# Patient Record
Sex: FEMALE | Race: WHITE | URBAN - METROPOLITAN AREA
[De-identification: names, ages, dates, MRNs, and addresses within clinical notes are randomized per-mention and may not be internally consistent; named-entity substitution may affect disease eponyms.]

---

## 2017-10-23 ENCOUNTER — MEDICAL CORRESPONDENCE (OUTPATIENT)
Dept: HEALTH INFORMATION MANAGEMENT | Facility: CLINIC | Age: 27
End: 2017-10-23

## 2018-02-15 ENCOUNTER — TRANSFERRED RECORDS (OUTPATIENT)
Dept: HEALTH INFORMATION MANAGEMENT | Facility: CLINIC | Age: 28
End: 2018-02-15

## 2018-02-15 ENCOUNTER — MEDICAL CORRESPONDENCE (OUTPATIENT)
Dept: HEALTH INFORMATION MANAGEMENT | Facility: CLINIC | Age: 28
End: 2018-02-15

## 2018-02-16 ENCOUNTER — RADIANT APPOINTMENT (OUTPATIENT)
Dept: MRI IMAGING | Facility: CLINIC | Age: 28
End: 2018-02-16
Attending: FAMILY MEDICINE
Payer: COMMERCIAL

## 2018-02-16 DIAGNOSIS — R51.9 WORSENING HEADACHES: ICD-10-CM

## 2018-02-23 ASSESSMENT — ENCOUNTER SYMPTOMS
POLYDIPSIA: 0
HALLUCINATIONS: 0
WEAKNESS: 0
NECK MASS: 0
DIZZINESS: 0
NUMBNESS: 0
SEIZURES: 0
SPEECH CHANGE: 0
FEVER: 0
DECREASED APPETITE: 0
MEMORY LOSS: 0
TASTE DISTURBANCE: 0
TINGLING: 0
WEIGHT LOSS: 0
SINUS PAIN: 1
TREMORS: 0
SINUS CONGESTION: 0
CHILLS: 1
SORE THROAT: 0
PARALYSIS: 0
HOARSE VOICE: 0
WEIGHT GAIN: 0
TROUBLE SWALLOWING: 0
FATIGUE: 1
DISTURBANCES IN COORDINATION: 0
INCREASED ENERGY: 0
NIGHT SWEATS: 0
ALTERED TEMPERATURE REGULATION: 0
SMELL DISTURBANCE: 0
POLYPHAGIA: 0
HEADACHES: 1
LOSS OF CONSCIOUSNESS: 0

## 2018-02-25 ENCOUNTER — HEALTH MAINTENANCE LETTER (OUTPATIENT)
Age: 28
End: 2018-02-25

## 2018-03-05 ENCOUNTER — OFFICE VISIT (OUTPATIENT)
Dept: NEUROLOGY | Facility: CLINIC | Age: 28
End: 2018-03-05
Payer: COMMERCIAL

## 2018-03-05 VITALS
BODY MASS INDEX: 22.68 KG/M2 | HEART RATE: 78 BPM | HEIGHT: 70 IN | SYSTOLIC BLOOD PRESSURE: 114 MMHG | DIASTOLIC BLOOD PRESSURE: 67 MMHG | WEIGHT: 158.4 LBS

## 2018-03-05 DIAGNOSIS — R51.9 NONINTRACTABLE HEADACHE, UNSPECIFIED CHRONICITY PATTERN, UNSPECIFIED HEADACHE TYPE: ICD-10-CM

## 2018-03-05 DIAGNOSIS — R93.0 ABNORMAL MRI OF HEAD: ICD-10-CM

## 2018-03-05 DIAGNOSIS — I67.1 CEREBRAL ANEURYSM, NONRUPTURED: Primary | ICD-10-CM

## 2018-03-05 ASSESSMENT — PAIN SCALES - GENERAL: PAINLEVEL: NO PAIN (0)

## 2018-03-05 NOTE — PATIENT INSTRUCTIONS
Call if headaches are getting worse    Go to ER if headache is sudden and intractable (not going away with position changes)    Avoids aspirin products, NSAIDs

## 2018-03-05 NOTE — PROGRESS NOTES
"    [facility]    Ernestina Danielle MRN# [id number]   Age: 27 year old YOB: 1990     Requesting physician: Kristel Avilez  No primary care provider on file.     Chief Complaint:    Referred by Dr Avilez for new and different headaches and an abnormal MRI     History of Present Illness:    Ernestina is a 27-year-old right-handed woman who presents to discuss headaches. She has had new and different headaches for 4 months.  This pain occurs in certain positions such as laying on her stomach and pushing up while she is studying or bending over to tie her shoes. She describes a painful pressure sensation in the headache. She has a sharp pain over the right TMJ and then a pressure is felt in the back of the head. It is instant and is instantly relieved by changed position.  She has not been exercising recently to notice whether Valsalva or extreme exercise triggers the pain.  It is just a pain with no other associated symptoms.  Specifically she reports she has no tingling, weakness, numbness, speech, memory problems. Balance is ok.     In October she awoke with a series of headaches. She had bought a new pillow and when she switched those out it went away.  This new headaches started after that.    She has \"menstrual migraines\" for the past 2 years. She can have a side of the head. No nausea, light or sound sensitvity. She treats with Tylenol or Excedrin and it works.  Those headaches are much different than these new headaches.    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      Past Medical History:   Diagnosis Date     Allergic rhinitis      Asthma      Migraine        There is no problem list on file for this patient.      Past Surgical History:   Procedure Laterality Date     TONSILLECTOMY  1998       Social History     Social History     Marital status: Single     Spouse name: N/A     Number of children: N/A     Years of education: N/A " "    Occupational History     Not on file.     Social History Main Topics     Smoking status: Never Smoker     Smokeless tobacco: Never Used     Alcohol use No     Drug use: No     Sexual activity: No     Other Topics Concern     Not on file     Social History Narrative     No narrative on file     Getting PhD in epidemiology.   Caffeine 2 cups of coffee a day        Family History   Problem Relation Age of Onset     Breast Cancer Mother      Hypertension Mother      Hyperlipidemia Mother      Mother has migraine with aura.  No stroke risk, no family history of aneurysm    No current outpatient prescriptions on file.     No current facility-administered medications for this visit.        ROS: Please see HPI above  and pt quiestionaire below.  All other systems review and negative.    Physical Examination:  /67  Pulse 78  Ht 1.778 m (5' 10\")  Wt 71.8 kg (158 lb 6.4 oz)  BMI 22.73 kg/m2  General Appearance:  The patient is well-groomed and cooperative with examination.  Neurological Examination  Cognition: oriented x3, attention and recall intact. No aphasia or dysarthria  Cranial Nerves: 2-12 intact. Funduscopic examination is normal with sharp disc margins bilaterally.   General Motor Survey: Normal muscle bulk, tone and strength in all four ext. No tremor  Coordination: Finger to nose and heel knee shin normal bilaterally. Normal alternating movements.  Reflexes: Upper and lower extremity reflexes are within normal limits (2+) and bilaterally symmetric.   Sensory Examination:   Vibration: normal in all four ext     Gait: Normal gait which is stable on turns. Normal arm swing. Romberg negative.    Cardiovascular Examination:  Heart is regular in rate and rhythm to auscultation. No significant murmurs. No carotid bruits. No significant peripheral edema. Pedal pulses are palpable bilaterally.     Musculoskeletal Examination:  Neck is supple with full range of motion. No tenderness to " palpitations.      Investigations:  Images reviewed with patient during the appointment  Brain MRI without intravenous contrast  Head MRA without intravenous contrast     History:  ; Worsening headaches.  Comparison:  none  Technique:     BRAIN MRI: sagittal T1-weighted and axial diffusion, FLAIR,T2-weighted, and susceptibility images of the whole brain without intravenous contrast.      HEAD MR Angiogram: 3D time-of-flight MRA of the head was also obtained without intravenous contrast.     Findings:  Head MRI:      There is no mass effect or midline shift or intracranial hemorrhage.  Axial diffusion weighted images demonstrate no definite acute  infarction. Single nonspecific tiny focus of T2 hyperintensity located  in the left medial globus pallidus without any accompanying restricted  diffusion, suppression on FLAIR  or susceptibility artifact. The  ventricles do not appear enlarged out of proportion to the cerebral  sulci. The major intracranial vascular flow-voids do appear patent.  Clear paranasal sinuses and mastoid air cells. Orbital structures are  unremarkable. Visualized soft tissues are unremarkable.  Better visualized on sagittal T1-weighted sequences, there is a 3-4 mm  low positioning of the cerebellar tonsils consistent with borderline   cerebellar tonsillar ectopia. However, there is no mass effect on the  medulla or compression at the foramen magnum.     Head MRA: There is wide neck saccular outpouching arising from the  right posterior cerebral artery P1 segment, projecting posteriorly.  This is most consistent with a small saccular aneurysm with a neck  measurement of 2.4 Mm and dome to neck measured as 2 mm.  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 No associated aneurysm.  No stenosis is visualized. Dominant right vertebral  artery and  hypoplastic left vertebral artery. The anterior communicating artery  appears patent. Regarding the posterior communicating arteries, they  appear patent bilaterally. Bilateral irregularities of the cavernous  carotids without clear aneurysm present. There are likely normal  variant infundibula of the anterior choroidal arteries bilaterally  from the internal carotid arteries without clear aneurysmal  enlargement.         Impression:     1. 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 Recommend follow-up 3T head MRA in 6 months to confirm stability, which also could evaluate for stability of the suspected bilateral anterior choroidal-internal carotid artery infundibula. Also consider dedicated neuro interventional consult in the interim.  2. Solitary tiny focus of T2 hyperintensity in the right medial globus pallidus. Finding is nonspecific and most likely could represent sequela of nonspecific previous cerebral insult.  3. Borderline cerebellar tonsillar ectopia; this could also follow the current examination but is of doubtful clinical significance at this  time as there is no don herniation or compression of the brainstem.         I have personally reviewed the examination and initial interpretation and I agree with the findings.     RUPINDER SANTOS MD    Impression/Recommendations:    1.  Positional headaches  The patient is reporting that in a certain position with neck extension and also increased pressure in the head the patient gets pain right at the base of the skull.  I am concerned the cerebellar tonsillar ectopia is actually the cause of those headaches.  Further monitoring would be important with just avoidance of the triggers at this point in time.  Repeat MRI scan for 6 months time has been ordered.    2.  Nonruptured cerebral aneurysm  MRA angiography of the head reveals a small nonruptured intracerebral aneurysm off the posterior cerebral  artery.  Measurements of 2.4 mm x 2 mm.  At this point in time it is unclear whether this is symptomatic or not.  No hemorrhage was seen on the scan.  The pain the patient has been having is very short lasting.  I suspect it is an incidental finding but needs monitoring.  In 6 months time we will repeat MR angiography.  The patient was offered consultation with stroke neurology as well as cerebral angiography and she declines at this time.  We did review that she should avoid anticoagulation and antiplatelet therapy.  She will use Tylenol as needed for her pain symptoms.  She will contact me if the headaches getting worse and if she has a thunderclap headache that is unrelenting with position change she will go to the closest emergency department.      Answers for HPI/ROS submitted by the patient on 2/23/2018   General Symptoms: Yes  Skin Symptoms: No  HENT Symptoms: Yes  EYE SYMPTOMS: No  HEART SYMPTOMS: No  LUNG SYMPTOMS: No  INTESTINAL SYMPTOMS: No  URINARY SYMPTOMS: No  GYNECOLOGIC SYMPTOMS: No  BREAST SYMPTOMS: No  SKELETAL SYMPTOMS: No  BLOOD SYMPTOMS: No  NERVOUS SYSTEM SYMPTOMS: Yes  MENTAL HEALTH SYMPTOMS: No  Fever: No  Loss of appetite: No  Weight loss: No  Weight gain: No  Fatigue: Yes  Night sweats: No  Chills: Yes  Increased stress: Yes  Excessive hunger: No  Excessive thirst: No  Feeling hot or cold when others believe the temperature is normal: No  Loss of height: No  Post-operative complications: No  Surgical site pain: No  Hallucinations: No  Change in or Loss of Energy: No  Hyperactivity: No  Confusion: No  Ear pain: Yes  Ear discharge: No  Hearing loss: No  Tinnitus: Yes  Nosebleeds: No  Congestion: No  Sinus pain: Yes  Trouble swallowing: No   Voice hoarseness: No  Mouth sores: No  Sore throat: No  Tooth pain: No  Gum tenderness: No  Bleeding gums: No  Change in taste: No  Change in sense of smell: No  Dry mouth: No  Hearing aid used: No  Neck lump: No  Trouble with coordination: No  Dizziness  or trouble with balance: No  Fainting or black-out spells: No  Memory loss: No  Headache: Yes  Seizures: No  Speech problems: No  Tingling: No  Tremor: No  Weakness: No  Difficulty walking: No  Paralysis: No  Numbness: No

## 2018-03-05 NOTE — MR AVS SNAPSHOT
After Visit Summary   3/5/2018    Ernestina Danielle    MRN: [id number]           Patient Information     Date Of Birth          1990        Visit Information        Provider Department      3/5/2018 8:00 AM Trudy Rangel MD [facility] Neurology        Today's Diagnoses     Cerebral aneurysm, nonruptured    -  1    Abnormal MRI of head        Nonintractable headache, unspecified chronicity pattern, unspecified headache type          Care Instructions    Call if headaches are getting worse    Go to ER if headache is sudden and intractable (not going away with position changes)    Avoids aspirin products, NSAIDs          Follow-ups after your visit        Follow-up notes from your care team     Return in about 6 months (around 9/5/2018) for after MRI in 6 months.      Your next 10 appointments already scheduled     Sep 06, 2018  7:00 AM CDT   (Arrive by 6:45 AM)   MR BRAIN W/O CONTRAST with [address] MRI ([facility])    [address] [address] [phone]           Take your medicines as usual, unless your doctor tells you not to. Bring a list of your current medicines to your exam (including vitamins, minerals and over-the-counter drugs). Also bring the results of similar scans you may have had.  Please remove any body piercings and hair extensions before you arrive.  Follow your doctor s orders. If you do not, we may have to postpone your exam.  You may or may not receive IV contrast for this exam pending the discretion of the Radiologist.  You do not need to do anything special to prepare.  The MRI machine uses a strong magnet. Please wear clothes without metal (snaps, zippers). A sweatsuit works well, or we may give you a hospital gown.   **IMPORTANT** THE INSTRUCTIONS BELOW ARE ONLY FOR THOSE PATIENTS WHO HAVE BEEN PRESCRIBED SEDATION OR GENERAL ANESTHESIA DURING THEIR MRI PROCEDURE:  IF YOUR DOCTOR  PRESCRIBED ORAL SEDATION (take medicine to help you relax during your exam):   You must get the medicine from your doctor (oral medication) before you arrive. Bring the medicine to the exam. Do not take it at home. You ll be told when to take it upon arriving for your exam.   Arrive one hour early. Bring someone who can take you home after the test. Your medicine will make you sleepy. After the exam, you may not drive, take a bus or take a taxi by yourself.  IF YOUR DOCTOR PRESCRIBED IV SEDATION:   Arrive one hour early. Bring someone who can take you home after the test. Your medicine will make you sleepy. After the exam, you may not drive, take a bus or take a taxi by yourself.   No eating 6 hours before your exam. You may have clear liquids up until 4 hours before your exam. (Clear liquids include water, clear tea, black coffee and fruit juice without pulp.)  IF YOUR DOCTOR PRESCRIBED ANESTHESIA (be asleep for your exam):   Arrive 1 1/2 hours early. Bring someone who can take you home after the test. You may not drive, take a bus or take a taxi by yourself.   No eating 8 hours before your exam. You may have clear liquids up until 4 hours before your exam. (Clear liquids include water, clear tea, black coffee and fruit juice without pulp.)   You will spend four to five hours in the recovery room.  Please call the Imaging Department at your exam site with any questions.            Sep 06, 2018  7:45 AM CDT   (Arrive by 7:30 AM)   MR BRAIN & ANGIORAM with [id number]   [facility] ([facility])    [address] [address] [phone]           Take your medicines as usual, unless your doctor tells you not to. Bring a list of your current medicines to your exam (including vitamins, minerals and over-the-counter drugs). Also bring the results of similar scans you may have had.  Please remove any body piercings and hair extensions before you arrive.   Follow your doctor s orders. If you do not, we may have to postpone your exam.  You may or may not receive IV contrast for this exam pending the discretion of the Radiologist.  You do not need to do anything special to prepare.  The MRI machine uses a strong magnet. Please wear clothes without metal (snaps, zippers). A sweatsuit works well, or we may give you a hospital gown.   **IMPORTANT** THE INSTRUCTIONS BELOW ARE ONLY FOR THOSE PATIENTS WHO HAVE BEEN PRESCRIBED SEDATION OR GENERAL ANESTHESIA DURING THEIR MRI PROCEDURE:  IF YOUR DOCTOR PRESCRIBED ORAL SEDATION (take medicine to help you relax during your exam):   You must get the medicine from your doctor (oral medication) before you arrive. Bring the medicine to the exam. Do not take it at home. You ll be told when to take it upon arriving for your exam.   Arrive one hour early. Bring someone who can take you home after the test. Your medicine will make you sleepy. After the exam, you may not drive, take a bus or take a taxi by yourself.  IF YOUR DOCTOR PRESCRIBED IV SEDATION:   Arrive one hour early. Bring someone who can take you home after the test. Your medicine will make you sleepy. After the exam, you may not drive, take a bus or take a taxi by yourself.   No eating 6 hours before your exam. You may have clear liquids up until 4 hours before your exam. (Clear liquids include water, clear tea, black coffee and fruit juice without pulp.)  IF YOUR DOCTOR PRESCRIBED ANESTHESIA (be asleep for your exam):   Arrive 1 1/2 hours early. Bring someone who can take you home after the test. You may not drive, take a bus or take a taxi by yourself.   No eating 8 hours before your exam. You may have clear liquids up until 4 hours before your exam. (Clear liquids include water, clear tea, black coffee and fruit juice without pulp.)   You will spend four to five hours in the recovery room.  Please call the Imaging Department at your exam site with any questions.          "   Sep 10, 2018  8:00 AM CDT   (Arrive by 7:45 AM)   Return Visit with Trudy Rangel MD   [facility] Neurology ([facility])    [address] [address] [phone]              Future tests that were ordered for you today     Open Future Orders        Priority Expected Expires Ordered    MR Brain w/o Contrast Routine 9/5/2018 3/5/2019 3/5/2018    MRA Angiogram Brain Routine 9/5/2018 3/5/2019 3/5/2018            Who to contact     Please call your clinic at [phone] to:    Ask questions about your health    Make or cancel appointments    Discuss your medicines    Learn about your test results    Speak to your doctor            Additional Information About Your Visit        Med[employer] Information     [employer] gives you secure access to your electronic health record. If you see a primary care provider, you can also send messages to your care team and make appointments. If you have questions, please call your primary care clinic.  If you do not have a primary care provider, please call [phone] and they will assist you.      [employer] is an electronic gateway that provides easy, online access to your medical records. With [employer], you can request a clinic appointment, read your test results, renew a prescription or communicate with your care team.     To access your existing account, please contact your [facility] or call [phone] for assistance.        Care EveryWhere ID     This is your Care EveryWhere ID. This could be used by other organizations to access your [place] medical records  [id number]        Your Vitals Were     Pulse Height BMI (Body Mass Index)             78 1.778 m (5' 10\") 22.73 kg/m2          Blood Pressure from Last 3 Encounters:   03/05/18 114/67    Weight from Last 3 Encounters:   03/05/18 71.8 kg (158 lb 6.4 oz)               Primary Care Provider    None Specified       No primary " provider on file.        Equal Access to Services     LISA GRAY : Hadii guillaume Espinosa, laney cadena, shanika millan. So [facility] [phone].    ATENCIÓN: Si habla español, tiene a medrano disposición servicios gratuitos de asistencia lingüística. Llame al [phone].    We comply with applicable federal civil rights laws and [place] laws. We do not discriminate on the basis of race, color, national origin, age, disability, sex, sexual orientation, or gender identity.            Thank you!     Thank you for choosing [facility]  for your care. Our goal is always to provide you with excellent care. Hearing back from our patients is one way we can continue to improve our services. Please take a few minutes to complete the written survey that you may receive in the mail after your visit with us. Thank you!             Your Updated Medication List - Protect others around you: Learn how to safely use, store and throw away your medicines at www.disposemymeds.org.      Notice  As of 3/5/2018  8:50 AM    You have not been prescribed any medications.

## 2018-03-05 NOTE — LETTER
"3/5/2018       RE: Ernestina Danielle  [address]   [facility] [address]     Dear Colleague,    Thank you for referring your patient, Ernestina Danielle, to the [facility] NEUROLOGY at [facility]. Please see a copy of my visit note below.        [facility]    Ernestina Danielle MRN# [id number]   Age: 27 year old YOB: 1990     Requesting physician: Kristel Avilez  No primary care provider on file.     Chief Complaint:    Referred by Dr Avilez for new and different headaches and an abnormal MRI     History of Present Illness:    Ernestina is a 27-year-old right-handed woman who presents to discuss headaches. She has had new and different headaches for 4 months.  This pain occurs in certain positions such as laying on her stomach and pushing up while she is studying or bending over to tie her shoes. She describes a painful pressure sensation in the headache. She has a sharp pain over the right TMJ and then a pressure is felt in the back of the head. It is instant and is instantly relieved by changed position.  She has not been exercising recently to notice whether Valsalva or extreme exercise triggers the pain.  It is just a pain with no other associated symptoms.  Specifically she reports she has no tingling, weakness, numbness, speech, memory problems. Balance is ok.     In October she awoke with a series of headaches. She had bought a new pillow and when she switched those out it went away.  This new headaches started after that.    She has \"menstrual migraines\" for the past 2 years. She can have a side of the head. No nausea, light or sound sensitvity. She treats with Tylenol or Excedrin and it works.  Those headaches are much different than these new headaches.    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      Past Medical History:   Diagnosis Date     Allergic rhinitis      Asthma      Migraine        There " "is no problem list on file for this patient.      Past Surgical History:   Procedure Laterality Date     TONSILLECTOMY  1998       Social History     Social History     Marital status: Single     Spouse name: N/A     Number of children: N/A     Years of education: N/A     Occupational History     Not on file.     Social History Main Topics     Smoking status: Never Smoker     Smokeless tobacco: Never Used     Alcohol use No     Drug use: No     Sexual activity: No     Other Topics Concern     Not on file     Social History Narrative     No narrative on file     Getting PhD in epidemiology.   Caffeine 2 cups of coffee a day        Family History   Problem Relation Age of Onset     Breast Cancer Mother      Hypertension Mother      Hyperlipidemia Mother      Mother has migraine with aura.  No stroke risk, no family history of aneurysm    No current outpatient prescriptions on file.     No current facility-administered medications for this visit.        ROS: Please see HPI above  and pt quiestionaire below.  All other systems review and negative.    Physical Examination:  /67  Pulse 78  Ht 1.778 m (5' 10\")  Wt 71.8 kg (158 lb 6.4 oz)  BMI 22.73 kg/m2  General Appearance:  The patient is well-groomed and cooperative with examination.  Neurological Examination  Cognition: oriented x3, attention and recall intact. No aphasia or dysarthria  Cranial Nerves: 2-12 intact. Funduscopic examination is normal with sharp disc margins bilaterally.   General Motor Survey: Normal muscle bulk, tone and strength in all four ext. No tremor  Coordination: Finger to nose and heel knee shin normal bilaterally. Normal alternating movements.  Reflexes: Upper and lower extremity reflexes are within normal limits (2+) and bilaterally symmetric.   Sensory Examination:   Vibration: normal in all four ext     Gait: Normal gait which is stable on turns. Normal arm swing. Romberg negative.    Cardiovascular Examination:  Heart is regular " in rate and rhythm to auscultation. No significant murmurs. No carotid bruits. No significant peripheral edema. Pedal pulses are palpable bilaterally.     Musculoskeletal Examination:  Neck is supple with full range of motion. No tenderness to palpitations.      Investigations:  Images reviewed with patient during the appointment  Brain MRI without intravenous contrast  Head MRA without intravenous contrast     History:  ; Worsening headaches.  Comparison:  none  Technique:     BRAIN MRI: sagittal T1-weighted and axial diffusion, FLAIR,T2-weighted, and susceptibility images of the whole brain without intravenous contrast.      HEAD MR Angiogram: 3D time-of-flight MRA of the head was also obtained without intravenous contrast.     Findings:  Head MRI:      There is no mass effect or midline shift or intracranial hemorrhage.  Axial diffusion weighted images demonstrate no definite acute  infarction. Single nonspecific tiny focus of T2 hyperintensity located  in the left medial globus pallidus without any accompanying restricted  diffusion, suppression on FLAIR  or susceptibility artifact. The  ventricles do not appear enlarged out of proportion to the cerebral  sulci. The major intracranial vascular flow-voids do appear patent.  Clear paranasal sinuses and mastoid air cells. Orbital structures are  unremarkable. Visualized soft tissues are unremarkable.  Better visualized on sagittal T1-weighted sequences, there is a 3-4 mm  low positioning of the cerebellar tonsils consistent with borderline   cerebellar tonsillar ectopia. However, there is no mass effect on the  medulla or compression at the foramen magnum.     Head MRA: There is wide neck saccular outpouching arising from the  right posterior cerebral artery P1 segment, projecting posteriorly.  This is most consistent with a small saccular aneurysm with a neck  measurement of 2.4 Mm and dome to neck measured as 2 mm.  Fenestration-duplication present at the  bifurcation of the internal  carotid artery into the right anterior cerebral artery A1 segment and  proximal right middle cerebral artery, considered as a normal  variation. No associated aneurysm.  No stenosis is visualized. Dominant right vertebral artery and  hypoplastic left vertebral artery. The anterior communicating artery  appears patent. Regarding the posterior communicating arteries, they  appear patent bilaterally. Bilateral irregularities of the cavernous  carotids without clear aneurysm present. There are likely normal  variant infundibula of the anterior choroidal arteries bilaterally  from the internal carotid arteries without clear aneurysmal  enlargement.         Impression:     1. 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 Recommend follow-up 3T head MRA in 6 months to confirm stability, which also could evaluate for stability of the suspected bilateral anterior choroidal-internal carotid artery infundibula. Also consider dedicated neuro interventional consult in the interim.  2. Solitary tiny focus of T2 hyperintensity in the right medial globus pallidus. Finding is nonspecific and most likely could represent sequela of nonspecific previous cerebral insult.  3. Borderline cerebellar tonsillar ectopia; this could also follow the current examination but is of doubtful clinical significance at this  time as there is no don herniation or compression of the brainstem.         I have personally reviewed the examination and initial interpretation and I agree with the findings.     RUPINDER SANTOS MD    Impression/Recommendations:    1.  Positional headaches  The patient is reporting that in a certain position with neck extension and also increased pressure in the head the patient gets pain right at the base of the skull.  I am concerned the cerebellar tonsillar ectopia is actually the cause of those headaches.  Further monitoring would be important with  just avoidance of the triggers at this point in time.  Repeat MRI scan for 6 months time has been ordered.    2.  Nonruptured cerebral aneurysm  MRA angiography of the head reveals a small nonruptured intracerebral aneurysm off the posterior cerebral artery.  Measurements of 2.4 mm x 2 mm.  At this point in time it is unclear whether this is symptomatic or not.  No hemorrhage was seen on the scan.  The pain the patient has been having is very short lasting.  I suspect it is an incidental finding but needs monitoring.  In 6 months time we will repeat MR angiography.  The patient was offered consultation with stroke neurology as well as cerebral angiography and she declines at this time.  We did review that she should avoid anticoagulation and antiplatelet therapy.  She will use Tylenol as needed for her pain symptoms.  She will contact me if the headaches getting worse and if she has a thunderclap headache that is unrelenting with position change she will go to the closest emergency department.    Again, thank you for allowing me to participate in the care of your patient.      Sincerely,    Trudy Rangel MD

## 2018-03-06 ENCOUNTER — NURSE TRIAGE (OUTPATIENT)
Dept: NURSING | Facility: CLINIC | Age: 28
End: 2018-03-06

## 2018-03-06 ENCOUNTER — HOSPITAL ENCOUNTER (EMERGENCY)
Facility: CLINIC | Age: 28
Discharge: HOME | End: 2018-03-06
Attending: EMERGENCY MEDICINE | Admitting: EMERGENCY MEDICINE
Payer: COMMERCIAL

## 2018-03-06 ENCOUNTER — APPOINTMENT (OUTPATIENT)
Dept: CT IMAGING | Facility: CLINIC | Age: 28
End: 2018-03-06
Attending: EMERGENCY MEDICINE
Payer: COMMERCIAL

## 2018-03-06 VITALS
TEMPERATURE: 98.2 F | SYSTOLIC BLOOD PRESSURE: 112 MMHG | HEART RATE: 88 BPM | OXYGEN SATURATION: 98 % | WEIGHT: 159 LBS | BODY MASS INDEX: 22.76 KG/M2 | RESPIRATION RATE: 16 BRPM | DIASTOLIC BLOOD PRESSURE: 67 MMHG | HEIGHT: 70 IN

## 2018-03-06 DIAGNOSIS — R20.2 FACIAL PARESTHESIA: ICD-10-CM

## 2018-03-06 LAB
ANION GAP SERPL CALCULATED.3IONS-SCNC: 7 MMOL/L (ref 3–14)
APTT PPP: 33 SEC (ref 22–37)
BASOPHILS # BLD AUTO: 0 10E9/L (ref 0–0.2)
BASOPHILS NFR BLD AUTO: 0.4 %
BUN SERPL-MCNC: 14 MG/DL (ref 7–30)
CALCIUM SERPL-MCNC: 9.4 MG/DL (ref 8.5–10.1)
CHLORIDE SERPL-SCNC: 106 MMOL/L (ref 94–109)
CO2 SERPL-SCNC: 28 MMOL/L (ref 20–32)
CREAT BLD-MCNC: 0.7 MG/DL (ref 0.52–1.04)
CREAT SERPL-MCNC: 0.69 MG/DL (ref 0.52–1.04)
DIFFERENTIAL METHOD BLD: NORMAL
EOSINOPHIL # BLD AUTO: 0 10E9/L (ref 0–0.7)
EOSINOPHIL NFR BLD AUTO: 0.5 %
ERYTHROCYTE [DISTWIDTH] IN BLOOD BY AUTOMATED COUNT: 12.4 % (ref 10–15)
GFR SERPL CREATININE-BSD FRML MDRD: >90 ML/MIN/1.7M2
GFR SERPL CREATININE-BSD FRML MDRD: >90 ML/MIN/1.7M2
GLUCOSE SERPL-MCNC: 98 MG/DL (ref 70–99)
HCG UR QL: NEGATIVE
HCT VFR BLD AUTO: 40.5 % (ref 35–47)
HGB BLD-MCNC: 13.5 G/DL (ref 11.7–15.7)
IMM GRANULOCYTES # BLD: 0 10E9/L (ref 0–0.4)
IMM GRANULOCYTES NFR BLD: 0.1 %
INR BLD: 1.2 (ref 0.86–1.14)
INTERNAL QC OK POCT: YES
LYMPHOCYTES # BLD AUTO: 2.3 10E9/L (ref 0.8–5.3)
LYMPHOCYTES NFR BLD AUTO: 30.4 %
MCH RBC QN AUTO: 29.3 PG (ref 26.5–33)
MCHC RBC AUTO-ENTMCNC: 33.3 G/DL (ref 31.5–36.5)
MCV RBC AUTO: 88 FL (ref 78–100)
MONOCYTES # BLD AUTO: 0.4 10E9/L (ref 0–1.3)
MONOCYTES NFR BLD AUTO: 4.7 %
NEUTROPHILS # BLD AUTO: 4.9 10E9/L (ref 1.6–8.3)
NEUTROPHILS NFR BLD AUTO: 63.9 %
NRBC # BLD AUTO: 0 10*3/UL
NRBC BLD AUTO-RTO: 0 /100
PLATELET # BLD AUTO: 211 10E9/L (ref 150–450)
POTASSIUM SERPL-SCNC: 3.8 MMOL/L (ref 3.4–5.3)
RBC # BLD AUTO: 4.6 10E12/L (ref 3.8–5.2)
SODIUM SERPL-SCNC: 140 MMOL/L (ref 133–144)
TROPONIN I BLD-MCNC: 0 UG/L (ref 0–0.1)
WBC # BLD AUTO: 7.6 10E9/L (ref 4–11)

## 2018-03-06 PROCEDURE — 85025 COMPLETE CBC W/AUTO DIFF WBC: CPT | Performed by: EMERGENCY MEDICINE

## 2018-03-06 PROCEDURE — 25000128 H RX IP 250 OP 636: Performed by: STUDENT IN AN ORGANIZED HEALTH CARE EDUCATION/TRAINING PROGRAM

## 2018-03-06 PROCEDURE — 85610 PROTHROMBIN TIME: CPT | Mod: QW

## 2018-03-06 PROCEDURE — 85730 THROMBOPLASTIN TIME PARTIAL: CPT | Performed by: EMERGENCY MEDICINE

## 2018-03-06 PROCEDURE — 93010 ELECTROCARDIOGRAM REPORT: CPT | Mod: Z6 | Performed by: EMERGENCY MEDICINE

## 2018-03-06 PROCEDURE — 81025 URINE PREGNANCY TEST: CPT | Performed by: EMERGENCY MEDICINE

## 2018-03-06 PROCEDURE — 80048 BASIC METABOLIC PNL TOTAL CA: CPT | Performed by: EMERGENCY MEDICINE

## 2018-03-06 PROCEDURE — 99285 EMERGENCY DEPT VISIT HI MDM: CPT | Mod: 25 | Performed by: EMERGENCY MEDICINE

## 2018-03-06 PROCEDURE — 93005 ELECTROCARDIOGRAM TRACING: CPT | Performed by: EMERGENCY MEDICINE

## 2018-03-06 PROCEDURE — 82565 ASSAY OF CREATININE: CPT

## 2018-03-06 PROCEDURE — 70498 CT ANGIOGRAPHY NECK: CPT

## 2018-03-06 PROCEDURE — 84484 ASSAY OF TROPONIN QUANT: CPT

## 2018-03-06 RX ORDER — IOPAMIDOL 755 MG/ML
75 INJECTION, SOLUTION INTRAVASCULAR ONCE
Status: COMPLETED | OUTPATIENT
Start: 2018-03-06 | End: 2018-03-06

## 2018-03-06 RX ADMIN — IOPAMIDOL 75 ML: 755 INJECTION, SOLUTION INTRAVENOUS at 19:03

## 2018-03-06 ASSESSMENT — ENCOUNTER SYMPTOMS
UNEXPECTED WEIGHT CHANGE: 0
EYE REDNESS: 0
FEVER: 0
NUMBNESS: 0
FACIAL ASYMMETRY: 0
ARTHRALGIAS: 0
APPETITE CHANGE: 0
COLOR CHANGE: 0
SPEECH DIFFICULTY: 0
CHILLS: 0
VOMITING: 0
DIFFICULTY URINATING: 0
ABDOMINAL PAIN: 0
HEADACHES: 0
SHORTNESS OF BREATH: 0
CONFUSION: 0
WEAKNESS: 0
NECK STIFFNESS: 0
NAUSEA: 0

## 2018-03-06 ASSESSMENT — VISUAL ACUITY
OU: NORMAL ACUITY

## 2018-03-06 NOTE — ED AVS SNAPSHOT
[facility], [place], Emergency Department    [address] [place] [id number]    Phone:  [phone]                                       Ernestina Danielle   MRN: [id number]    Department:  [facility], Emergency Department   Date of Visit:  3/6/2018           After Visit Summary Signature Page     I have received my discharge instructions, and my questions have been answered. I have discussed any challenges I see with this plan with the nurse or doctor.    ..........................................................................................................................................  Patient/Patient Representative Signature      ..........................................................................................................................................  Patient Representative Print Name and Relationship to Patient    ..................................................               ................................................  Date                                            Time    ..........................................................................................................................................  Reviewed by Signature/Title    ...................................................              ..............................................  Date                                                            Time

## 2018-03-06 NOTE — TELEPHONE ENCOUNTER
Patient was seen at clinic yesterday for ongoing headaches. Today she does not have a headache, but has tingling on the left side of her face, in her cheek. Has been diagnosed with a small brain aneurysm, she reported. Has been able to go about her normal daily activities today. Said both sides of her face move when she changes her facial expression.     Protocol and care advice reviewed.   Caller states understanding of the recommended disposition.  Advised to call back if further questions or concerns.     Lilliam Abraham RN/FNA    Reason for Disposition    [1] Tingling (e.g., pins and needles) of the face, arm / hand, or leg / foot on one side of the body AND [2] present now    Additional Information    Negative: [1] SEVERE weakness (i.e., unable to walk or barely able to walk, requires support) AND [2] new onset or worsening    Negative: [1] Weakness (i.e., paralysis, loss of muscle strength) of the face, arm / hand, or leg / foot on one side of the body AND [2] sudden onset AND [3] present now    Negative: [1] Numbness (i.e., loss of sensation) of the face, arm / hand, or leg / foot on one side of the body AND [2] sudden onset AND [3] present now    Negative: [1] Loss of speech or garbled speech AND [2] sudden onset AND [3] present now    Negative: Difficult to awaken or acting confused  (e.g., disoriented, slurred speech)    Negative: Sounds like a life-threatening emergency to the triager    Negative: Confusion, disorientation, or hallucinations is main symptom    Negative: Neck pain is main symptom (and having weakness, numbness, or tingling in arm / hand because of neck pain)    Negative: Back pain is main symptom (and having weakness, numbness, or tingling in leg because of back pain)    Negative: Hand pain is main symptom (and having mild weakness, numbness, or tingling in hand related to hand pain)    Negative: Dizziness is main symptom    Negative: Vision loss or change is main symptom    Negative:  Followed a head injury within last 3 days    Negative: Followed a neck injury within last 3 days    Negative: [1] Tingling in both hands and/or feet AND [2] breathing faster than normal AND [3] feels similar to prior panic attack or hyperventilation episode    Negative: Weakness in both sides of the body or weakness all over    Negative: Headache  (and neurologic deficit)    Negative: [1] Back pain AND [2] numbness (loss of sensation) in groin or rectal area    Negative: [1] Unable to urinate (or only a few drops) > 4 hours AND [2] bladder feels very full (e.g., palpable bladder or strong urge to urinate)    Negative: [1] Loss of control of bowel or bladder (i.e., incontinence) AND [2] new onset    Negative: [1] Weakness (i.e., paralysis, loss of muscle strength) of the face, arm / hand, or leg / foot on one side of the body AND [2] sudden onset AND [3] brief (now gone)    Negative: [1] Numbness (i.e., loss of sensation) of the face, arm / hand, or leg / foot on one side of the body AND [2] sudden onset AND [3] brief (now gone)    Negative: [1] Loss of speech or garbled speech AND [2] sudden onset AND [3] brief (now gone)    Negative: Bell's palsy suspected (i.e., weakness on only one side of the face, developing over hours to days, no other symptoms)    Negative: Patient sounds very sick or weak to the triager    Negative: Neck pain  (and neurologic deficit)    Negative: Back pain  (and neurologic deficit)    Negative: [1] Weakness of the face, arm / hand, or leg / foot on one side of the body AND [2] gradual onset (e.g., days to weeks) AND [3] present now    Negative: [1] Numbness (i.e., loss of sensation) of the face, arm / hand, or leg / foot on one side of the body AND [2] gradual onset (e.g., days to weeks) AND [3] present now    Negative: [1] Loss of speech or garbled speech AND [2] gradual onset (e.g., days to weeks) AND [3] present now    Protocols used: NEUROLOGIC DEFICIT-ADULT-AH

## 2018-03-06 NOTE — ED PROVIDER NOTES
"  History     Chief Complaint   Patient presents with     Tingling     left side face     HPI  Ernestina Danielle is a 27 year old female with history of posterior cerebral artery aneurysm c/b positional headaches and borderline cerebellar tonsillar ectopia who presents to the ED for the evaluation of left-sided facial paresthesias. The patient reports the acute onset of the paresthesias to be at around 13:30, about 4 hours prior to arrival today. The patient states that they have been constant and located over her left zygoma since, sometimes radiating superiorly around her orbital and sometimes down into her left neck, and she describes them as feeling like when \"you can't move your face after being outside in the cold for a while, except I wasn't outside.\" She denies any explicit numbness or weakness in the area or otherwise, stating instead that her sensation in the area is just altered. The patient does experience positional headaches thought to be secondary to her aneurysm, but she denies any change to the nature of those, and she does not have a persistent headache. No trouble speaking or finding words, and her ambulation is unaffected. No vision changes or associated trauma. No birth control use or hx of stroke. No recent fevers, chills, nausea, abdominal pain, or vomiting. No change to appetite, and the patient has been eating and drinking well. No chance of pregnancy. The patient offers no other concerns or complaints at this time.            PAST MEDICAL HISTORY:   Past Medical History:   Diagnosis Date     Allergic rhinitis      Asthma      Migraine        PAST SURGICAL HISTORY:   Past Surgical History:   Procedure Laterality Date     TONSILLECTOMY  1998       FAMILY HISTORY:   Family History   Problem Relation Age of Onset     Breast Cancer Mother      Hypertension Mother      Hyperlipidemia Mother        SOCIAL HISTORY:   Social History   Substance Use Topics     Smoking status: Never Smoker     Smokeless " "tobacco: Never Used     Alcohol use No       There are no discharge medications for this patient.         Allergies   Allergen Reactions     Seasonal Allergies Itching     Running nose, itchy eyes               I have reviewed the Medications, Allergies, Past Medical and Surgical History, and Social History in the Epic system.    Review of Systems   Constitutional: Negative for appetite change, chills, fever and unexpected weight change.   HENT: Negative for congestion.    Eyes: Negative for redness.   Respiratory: Negative for shortness of breath.    Cardiovascular: Negative for chest pain.   Gastrointestinal: Negative for abdominal pain, nausea and vomiting.   Genitourinary: Negative for difficulty urinating.   Musculoskeletal: Negative for arthralgias and neck stiffness.   Skin: Negative for color change.   Neurological: Negative for facial asymmetry, speech difficulty, weakness, numbness and headaches.        Paresthesias on left face   Psychiatric/Behavioral: Negative for confusion.       Physical Exam   BP: 124/66  Pulse: 88  Heart Rate: 74  Temp: 98.2  F (36.8  C)  Resp: 16  Height: 177.8 cm (5' 10\")  Weight: 72.1 kg (159 lb)  SpO2: 97 %      Physical Exam  General: awake, alert, NAD  Head: normal cephalic  HEENT: pupils equal, conjugate gaze in tact  Neck: Supple  CV: regular rate and rhythm without murmur  Lungs: clear to ascultation  Abd: soft, non-tender, no guarding, no peritoneal signs  EXT: lower extremities without swelling or edema  Neuro: awake, answers questions appropriately. No focal deficits noted.  Cranial nerves II through XII intact with the exception of subjective difference in touch on the face.  5 out of 5 strength in bilateral upper and lower extremities.  Normal finger to nose testing.  Normal tandem gait.  Negative Romberg.  ED Course     ED Course     Procedures             EKG Interpretation:      Interpreted by George Payne  Time reviewed: 1807  Symptoms at time of EKG: facial " numbness  Rhythm: normal sinus   Rate: normal  Axis: normal  Ectopy: none  Conduction: normal  ST Segments/ T Waves: No ST-T wave changes  Q Waves: none  Comparison to prior: No old EKG available    Clinical Impression: normal EKG                 Labs Ordered and Resulted from Time of ED Arrival Up to the Time of Departure from the ED   INR POINT OF CARE - Abnormal; Notable for the following:        Result Value    INR Point of Care 1.2 (*)     All other components within normal limits   HCG QUAL URINE POCT - Normal   CBC WITH PLATELETS DIFFERENTIAL   BASIC METABOLIC PANEL   PARTIAL THROMBOPLASTIN TIME   CREATININE POCT   NOTIFY   ISTAT INR NURSING POCT   ISTAT TROPONIN NURSING POCT   ISTAT CREATININE NURSING POCT   TROPONIN POCT            Assessments & Plan (with Medical Decision Making)   Georgia is a 27-year-old female who presents with approximately 6 hours of unilateral of facial tingling.  No other neuro deficits noted on exam.  She has an NIH score of 0.  I did consult neurology fellow regarding whether to make this a stroke code, he did feel that a stroke code was indicated.  I obtained a stat head CT and stat CTA and stat neurological consult.  This workup is currently pending.    Patient got a CT head and CTA which did not show any acute findings.  Neurology came and consulted.  They felt the deficit did not correspond with a vascular territory and thought likelihood of stroke was quite low.  No bleeding appreciated on head CT.  No other acute findings that could explain patient's symptoms.  They were concerned that potentially this could represent MS, recommended outpatient MRI in 3 months to follow-up from lesion noted on CT scan today.    Remainder of her lab evaluation was unremarkable including negative pregnancy test, negative trop, normal CBC, normal BMP.     At this point neurology recommended outpatient follow-up with her neurologist and repeat MRI in 3 months.  Patient stated her symptoms have  improved while being in the ER though has not completely resolved.  She did not develop any new neurologic symptoms while she was in the emergency department and her neuro exam remained unchanged on repeat.    I discussed with the patient if she were to develop any new neurologic deficits, if she developed a headache, or new symptoms she should return to the ER for repeat evaluation otherwise follow-up with her neurologist as outlined above.  She understands and agrees to this follow-up plan.    I have reviewed the nursing notes.    I have reviewed the findings, diagnosis, plan and need for follow up with the patient.    There are no discharge medications for this patient.      Final diagnoses:   Facial paresthesia   IMayur, am serving as a trained medical scribe to document services personally performed by George Payne MD, based on the provider's statements to me.      George RIGGINS MD, was physically present and have reviewed and verified the accuracy of this note documented by Mayur Zambrano.       3/6/2018   [facility], [place], EMERGENCY DEPARTMENT     George Payne MD  03/07/18 0024

## 2018-03-06 NOTE — TELEPHONE ENCOUNTER
"Reason for call: Ernestina calling FNA back to request to speak to a Neurologist about her symptoms. She had previously spoken to another FNA nurse today who recommended Ernestina be seen within 4 hours (UC/ER) due to her reported symptom of facial tingling. She is questioning if urgent care or ER care is appropriate as she was \"just seen yesterday\" in the [facility] Neurology clinic. Ernestina reports her symptoms \"haven't changed\" since previous triage call and she denies any new symptoms.   Symptoms: left sided facial tingling- cheek area, from eye to lips. \"I can feel the tingling in my fingers and cheek when I touch my face\".   Symptoms started today at 3 pm.    Denies pain, no headache. Denies vision changes. Denies left sided weakness, numbness, or tingling on left side of body (or right side). Denies swelling or redness of left side of face. Denies left ear symptoms.  Home cares tried: called for triage.  Educated to symptoms of stroke. Discussed care plan from [facility] Neurology clinic notes (EPIC encounter dated 3/5/18). Emergent symptoms reviewed.   Care advice given per triage guideline; advised caller to be seen within 4 hours in ER (or she could try UC first, may send her to ER). Caller verbalized understanding of care advice given and plans to speak to on call neurologist for 2nd level triage now. This nurse paged the Neurology Resident on call for the [facility] Neurology clinic at 5:03pm via [phone] () to call the patient back directly at [phone]. Caller advised to call FNA back in 30 minutes if no call from provider. Advised to seek immediate medical care or call 911 if symptoms worsen or new symptoms develop while waiting for provider call. Advised Ernestina to have another adult drive for safety. Reports she is currently home alone.     Katerin Brownlee RN  [facility]    Reason for Disposition    [1] Tingling (e.g., pins and needles) of the face, arm / hand, or leg / foot on one side of " the body AND [2] present now     Left sided facial tingling- cheek area    Started at 3 pm today    Additional Information    Negative: [1] SEVERE weakness (i.e., unable to walk or barely able to walk, requires support) AND [2] new onset or worsening    Negative: [1] Weakness (i.e., paralysis, loss of muscle strength) of the face, arm / hand, or leg / foot on one side of the body AND [2] sudden onset AND [3] present now    Negative: [1] Numbness (i.e., loss of sensation) of the face, arm / hand, or leg / foot on one side of the body AND [2] sudden onset AND [3] present now    Negative: [1] Loss of speech or garbled speech AND [2] sudden onset AND [3] present now    Negative: Difficult to awaken or acting confused  (e.g., disoriented, slurred speech)    Negative: Sounds like a life-threatening emergency to the triager    Negative: Confusion, disorientation, or hallucinations is main symptom    Negative: Neck pain is main symptom (and having weakness, numbness, or tingling in arm / hand because of neck pain)    Negative: Back pain is main symptom (and having weakness, numbness, or tingling in leg because of back pain)    Negative: Hand pain is main symptom (and having mild weakness, numbness, or tingling in hand related to hand pain)    Negative: Dizziness is main symptom    Negative: Vision loss or change is main symptom    Negative: Followed a head injury within last 3 days    Negative: Followed a neck injury within last 3 days    Negative: [1] Tingling in both hands and/or feet AND [2] breathing faster than normal AND [3] feels similar to prior panic attack or hyperventilation episode    Negative: Weakness in both sides of the body or weakness all over    Negative: Headache  (and neurologic deficit)    Negative: [1] Back pain AND [2] numbness (loss of sensation) in groin or rectal area    Negative: [1] Unable to urinate (or only a few drops) > 4 hours AND [2] bladder feels very full (e.g., palpable bladder or  "strong urge to urinate)    Negative: [1] Loss of control of bowel or bladder (i.e., incontinence) AND [2] new onset    Negative: [1] Weakness (i.e., paralysis, loss of muscle strength) of the face, arm / hand, or leg / foot on one side of the body AND [2] sudden onset AND [3] brief (now gone)    Negative: [1] Numbness (i.e., loss of sensation) of the face, arm / hand, or leg / foot on one side of the body AND [2] sudden onset AND [3] brief (now gone)    Negative: [1] Loss of speech or garbled speech AND [2] sudden onset AND [3] brief (now gone)    Negative: Bell's palsy suspected (i.e., weakness on only one side of the face, developing over hours to days, no other symptoms)    Negative: Patient sounds very sick or weak to the triager    Negative: Neck pain  (and neurologic deficit)    Negative: Back pain  (and neurologic deficit)    Negative: [1] Weakness of the face, arm / hand, or leg / foot on one side of the body AND [2] gradual onset (e.g., days to weeks) AND [3] present now    Negative: [1] Numbness (i.e., loss of sensation) of the face, arm / hand, or leg / foot on one side of the body AND [2] gradual onset (e.g., days to weeks) AND [3] present now    Negative: [1] Loss of speech or garbled speech AND [2] gradual onset (e.g., days to weeks) AND [3] present now    Answer Assessment - Initial Assessment Questions  1. SYMPTOM: \"What is the main symptom you are concerned about?\" (e.g., weakness, numbness)      Left sided facial tingling, cheek  2. ONSET: \"When did this start?\" (minutes, hours, days; while sleeping)      2 hours ago  3. LAST NORMAL: \"When was the last time you were normal (no symptoms)?\"      Before 3 pm today  4. PATTERN \"Does this come and go, or has it been constant since it started?\"  \"Is it present now?\"      denies  5. CARDIAC SYMPTOMS: \"Have you had any of the following symptoms: chest pain, difficulty breathing, palpitations?\"      Didn't report  6. NEUROLOGIC SYMPTOMS: \"Have you had any " "of the following symptoms: headache, dizziness, vision loss, double vision, changes in speech, unsteady on your feet?\"      denies  7. OTHER SYMPTOMS: \"Do you have any other symptoms?\"      denies  8. PREGNANCY: \"Is there any chance you are pregnant?\" \"When was your last menstrual period?\"      Didn't report    Protocols used: NEUROLOGIC DEFICIT-ADULT-AH    "

## 2018-03-06 NOTE — ED NOTES
Pt presents with c/o left facial tingling that started a couple hours ago. No other symptoms to report. Hx of aneurysm and tonsillar ectopia.

## 2018-03-06 NOTE — ED AVS SNAPSHOT
[facility], Emergency Department    [address] [address]    Phone:  [phone]                                       Ernestina Danielle   MRN: [id number]    Department:  [facility], Emergency Department   Date of Visit:  3/6/2018           Patient Information     Date Of Birth          1990        Your diagnoses for this visit were:     Facial paresthesia        You were seen by George Payne MD.        Discharge Instructions       Return to the emergency department with any new or worsening symptoms specifically any unilateral weakness, headache, or other neurologic changes.    Please follow-up with neurology.  Would recommend sooner than her scheduled six-month appointment.  Neurology also recommended MRI with contrast in 3 months. Please schedule this through either your PCP or neurologist.     Future Appointments        Provider Department Dept Phone Center    9/6/2018 7:00 AM [facility] MRI ROOM [address] [place] [address] [phone] [facility]    9/6/2018 7:45 AM [facility] MRI ROOM [address] [place] [address] [phone] [facility]    9/10/2018 8:00 AM Trudy Rangel MD [facility] Neurology [phone] [facility]      24 Hour Appointment Hotline       To make an appointment at any [facility], call [phone] ([phone]). If you don't have a family doctor or clinic, we will help you find one. [facility] clinics are conveniently located to serve the needs of you and your family.             Review of your medicines      Notice     You have not been prescribed any medications.            Procedures and tests performed during your visit     Basic metabolic panel    CBC with platelets differential    CT Head Neck Angio w/o & w Contrast    Creatinine POCT    EKG 12-lead, tracing only    INR point of care    ISTAT INR nursing POCT    ISTAT creatinine nursing POCT    ISTAT troponin nursing POCT    Notify CT that Stroke patient is in ED     Partial thromboplastin time    Troponin POCT    hCG qual urine POCT      Orders Needing Specimen Collection     None      Pending Results     Date and Time Order Name Status Description    3/6/2018 1806 EKG 12-lead, tracing only Preliminary             Pending Culture Results     No orders found from 3/4/2018 to 3/7/2018.            Pending Results Instructions     If you had any lab results that were not finalized at the time of your Discharge, you can call the ED Lab Result RN at [phone]. You will be contacted by this team for any positive Lab results or changes in treatment. The nurses are available 7 days a week from 10A to 6:30P.  You can leave a message 24 hours per day and they will return your call.        Thank you for choosing [facility]       Thank you for choosing [facility] for your care. Our goal is always to provide you with excellent care. Hearing back from our patients is one way we can continue to improve our services. Please take a few minutes to complete the written survey that you may receive in the mail after you visit with us. Thank you!        [facility] Information     [employer] gives you secure access to your electronic health record. If you see a primary care provider, you can also send messages to your care team and make appointments. If you have questions, please call your primary care clinic.  If you do not have a primary care provider, please call [phone] and they will assist you.        Care EveryWhere ID     This is your Care EveryWhere ID. This could be used by other organizations to access your [facility] medical records  [id number]        Equal Access to Services     LISA GRAY : Hadii guillaume Espinosa, waaxda luqadaha, qaybta kaalmada adejericayada, waxay idiin hayaan adeeg kharash la'aan . So [facility] [phone].    ATENCIÓN: Si habla español, tiene a medrano disposición servicios gratuitos de asistencia lingüística. Llame al [phone].    We comply with applicable federal  civil rights laws and [place] laws. We do not discriminate on the basis of race, color, national origin, age, disability, sex, sexual orientation, or gender identity.            After Visit Summary       This is your record. Keep this with you and show to your community pharmacist(s) and doctor(s) at your next visit.

## 2018-03-07 ENCOUNTER — CARE COORDINATION (OUTPATIENT)
Dept: NEUROLOGY | Facility: CLINIC | Age: 28
End: 2018-03-07

## 2018-03-07 ENCOUNTER — TELEPHONE (OUTPATIENT)
Dept: NEUROLOGY | Facility: CLINIC | Age: 28
End: 2018-03-07

## 2018-03-07 LAB — INTERPRETATION ECG - MUSE: NORMAL

## 2018-03-07 NOTE — TELEPHONE ENCOUNTER
I spoke with patient. She felt she was doing ok with anxiety but now questions this. Over all feels ok but not back to normal today. She describes facial numbness and a feeling of tightness. Told her to call back if symptoms get worse. I will add her on to my schedule for 11 am on Monday to check in with her before she leaves for spring break.     If she is feeling better she can cancel the appt.    Trudy Rangel MD [facility]  Department of Neurology    ----- Message from Eunice Solitario RN sent at 3/7/2018 10:41 AM CST -----  Patient was in the ED yesterday.  She is requesting that you look at her chart and her CT and if you are concerned with the findings she will come in to see you.  She is asking that you call her back to go over the findings.  She can be reached at [phone].    Thank you,  Eunice

## 2018-03-07 NOTE — CONSULTS
[facility], [place]      Neurology Stroke Consult    Patient Name: Ernestina Danielle  : 1990 MRN#: [id number]    STROKE DATA    Stroke Code:  Stroke code not activated.  Time patient seen:  2018 1830  Last known normal (pt's baseline):  2018 1330    TPA treatment:  Not given due to outside the time window, minor / isolated / quickly resolving stroke symptoms.     National Institutes of Health Stroke Scale (at presentation)  NIHSS done at:  time patient seen      Score    Level of consciousness:  (0)   Alert, keenly responsive     LOC questions:  (0)   Answers both questions correctly    LOC commands:  (0)   Performs both tasks correctly    Best gaze:  (0)   Normal    Visual:  (0)   No visual loss    Facial palsy:  (0)   Normal symmetrical movements    Motor arm (left):  (0)   No drift    Motor arm (right):  (0)   No drift    Motor leg (left):  (0)   No drift    Motor leg (right):  (0)   No drift    Limb ataxia:  (0)   Absent    Sensory:  (0)   Normal- no sensory loss    Best language:  (0)   Normal- no aphasia    Dysarthria:  (0)   Normal    Extinction and inattention:  (0)   No abnormality        NIHSS Total Score:  0        Dysphagia Screen  Time of screening: Deferred as symptoms not likely due to stroke  Screening results: N/A     ASSESSMENT & RECOMMENDATIONS     The patient was seen for left facial tingling. This started at 1330 and has been persistent. It is located over the zygomatic arch. Unlikely to be due to stroke or any vascular abnormality. Given her history of R PCOMM aneurysm CT/CTA were performed and found to be negative.  Differential would also include abnormal headache, anxiety, and MS. Her neuro exam is reassuring and she was seen by her neurologist yesterday. On her MRI the patient does have a T2 hyperintensity that we feel should be followed up with an MRI with contrast in about 3-4 months with general neurology consult.     Recommendations:  -MRI  with contrast in 3 months  -Follow up in general neurology clinic after MRI     The patient will be managed by the ED team and  we will sign off at this time.  Thank you for the consult.  Contact the stroke team if you have any further questions.    PEPE Danielle is a 27 year old female with PMH significant for R PCA aneurysm and headaches who presents to the ED for left facial tingling that developed around 1330 this afternoon. She states that she bent over to plug in her laptop and when she got back up she felt tingling on her left face over her zygomatic arch. Sometimes the tingling radiates up to around her orbit. She denies other neurologic deficits including diplopia, numbness/weakness, tinnitus, aphasia.     Of note, patient has a history of headache and has seen a neurologist for this issue. Her OP neurologist states that this is likely positional headache as it often occurs when changing positions. Workup included MRI that revealed the aforementioned aneurysm, as well as a nonspecific T2 hyperintensity in the right cerebral hemisphere and cerebellar ectopia that is of questionable significance. The patient denies a history of headache today.     Pertinent Past Medical/Surgical History  Past Medical History:   Diagnosis Date     Allergic rhinitis      Asthma      Migraine        Past Surgical History:   Procedure Laterality Date     TONSILLECTOMY  1998       Medications: I have reviewed this patient's current medications.    Allergies:   Allergies   Allergen Reactions     Seasonal Allergies Itching     Running nose, itchy eyes   .    Family History:   Family History   Problem Relation Age of Onset     Breast Cancer Mother      Hypertension Mother      Hyperlipidemia Mother    .    Social History:   Social History   Substance Use Topics     Smoking status: Never Smoker     Smokeless tobacco: Never Used     Alcohol use No   .    Tobacco use: Never    ROS:  The 10 point Review of Systems is negative other  than noted in the HPI or here.     PHYSICAL EXAMINATION  Vital Signs:  B/P: 124/66,  T: 98.2,  P: 88,  R: 16    General:  patient lying in bed without any acute distress    HEENT:  normocephalic/atraumatic  Cardio:  Regular rate  Pulmonary:  no respiratory distress  Abdomen:  soft, non-tender  Extremities:  no edema  Skin:  intact, warm/dry     Neurologic  Mental Status:  fully alert, attentive and oriented, follows commands, speech clear and fluent  Cranial Nerves:  visual fields intact, PERRL, EOMI with normal smooth pursuit, facial movements symmetric, hearing not formally tested but intact to conversation, palate elevation symmetric and uvula midline, no dysarthria, shoulder shrug strong bilaterally, tongue protrusion midline, facial sensation intact b/l  Motor:  no abnormal movements, normal tone throughout, normal muscle bulk, no pronator drift, able to move all limbs spontaneously, strength 5/5 throughout upper and lower extremities  Reflexes:  2+ and symmetric throughout, no clonus  Sensory:  Intact to light touch and temperature sensation throughout  Coordination:  FNF and HS intact without dysmetria  Station/Gait:  deferred    Labs  Labs and Imaging reviewed and used in developing the plan; pertinent results included.     No results found for: GLC    The patient was discussed with the Fellow, Dr. Campos.  The staff is Dr. Winchester.    Chung Benito MD

## 2018-03-07 NOTE — PROGRESS NOTES
Catalina Gomez [facility]-Neurosci--Adult-[facility]        Phone Number: [phone]                     Pt calling was seen in ED on 3/6/18 for facial tingling and a CT was done. Per pt the ed asked her to follow up with her neurologist. Per pt the ED determined the facial tingling is most likely stress related. Pt is asking for Dr. Rangel to look at her chart and CT and if she is concerned with the findings she will come in to see her. Pt is asking for a call back to go over findings.   Pt can be reached at [phone]     Thank You,   Catalina      3/7/18:  Message sent to Dr. Rangel asking her to review her chart and call Ernestina with her findings per the patient's request.

## 2018-03-12 ENCOUNTER — OFFICE VISIT (OUTPATIENT)
Dept: NEUROLOGY | Facility: CLINIC | Age: 28
End: 2018-03-12
Payer: COMMERCIAL

## 2018-03-12 VITALS — HEART RATE: 67 BPM | HEIGHT: 70 IN | DIASTOLIC BLOOD PRESSURE: 69 MMHG | SYSTOLIC BLOOD PRESSURE: 108 MMHG

## 2018-03-12 DIAGNOSIS — I67.1 CEREBRAL ANEURYSM, NONRUPTURED: Primary | ICD-10-CM

## 2018-03-12 DIAGNOSIS — R20.9 DISTURBANCE OF SKIN SENSATION: ICD-10-CM

## 2018-03-12 ASSESSMENT — PAIN SCALES - GENERAL: PAINLEVEL: NO PAIN (0)

## 2018-03-12 NOTE — MR AVS SNAPSHOT
After Visit Summary   3/12/2018    Ernestina Danielle    MRN: [id number]           Patient Information     Date Of Birth          1990        Visit Information        Provider Department      3/12/2018 11:00 AM Trudy Rangel MD [facility] Neurology        Today's Diagnoses     Cerebral aneurysm, nonruptured    -  1    Disturbance of skin sensation           Follow-ups after your visit        Follow-up notes from your care team     Return if symptoms worsen or fail to improve.      Your next 10 appointments already scheduled     Sep 06, 2018  7:00 AM CDT   (Arrive by 6:45 AM)   MR BRAIN W/O CONTRAST with [address] [facility] [place] [facility] ([facility] [place])    [address]  [facility] [address] [phone]           Take your medicines as usual, unless your doctor tells you not to. Bring a list of your current medicines to your exam (including vitamins, minerals and over-the-counter drugs). Also bring the results of similar scans you may have had.  Please remove any body piercings and hair extensions before you arrive.  Follow your doctor s orders. If you do not, we may have to postpone your exam.  You may or may not receive IV contrast for this exam pending the discretion of the Radiologist.  You do not need to do anything special to prepare.  The MRI machine uses a strong magnet. Please wear clothes without metal (snaps, zippers). A sweatsuit works well, or we may give you a hospital gown.   **IMPORTANT** THE INSTRUCTIONS BELOW ARE ONLY FOR THOSE PATIENTS WHO HAVE BEEN PRESCRIBED SEDATION OR GENERAL ANESTHESIA DURING THEIR MRI PROCEDURE:  IF YOUR DOCTOR PRESCRIBED ORAL SEDATION (take medicine to help you relax during your exam):   You must get the medicine from your doctor (oral medication) before you arrive. Bring the medicine to the exam. Do not take it at home. You ll be told when to take it upon arriving for your exam.   Arrive one hour  early. Bring someone who can take you home after the test. Your medicine will make you sleepy. After the exam, you may not drive, take a bus or take a taxi by yourself.  IF YOUR DOCTOR PRESCRIBED IV SEDATION:   Arrive one hour early. Bring someone who can take you home after the test. Your medicine will make you sleepy. After the exam, you may not drive, take a bus or take a taxi by yourself.   No eating 6 hours before your exam. You may have clear liquids up until 4 hours before your exam. (Clear liquids include water, clear tea, black coffee and fruit juice without pulp.)  IF YOUR DOCTOR PRESCRIBED ANESTHESIA (be asleep for your exam):   Arrive 1 1/2 hours early. Bring someone who can take you home after the test. You may not drive, take a bus or take a taxi by yourself.   No eating 8 hours before your exam. You may have clear liquids up until 4 hours before your exam. (Clear liquids include water, clear tea, black coffee and fruit juice without pulp.)   You will spend four to five hours in the recovery room.  Please call the Imaging Department at your exam site with any questions.            Sep 06, 2018  7:45 AM CDT   (Arrive by 7:30 AM)   MR BRAIN & ANGIORAM with [address] [facility] [place] [facility] ([facility])    [address] [place] [address] [phone]           Take your medicines as usual, unless your doctor tells you not to. Bring a list of your current medicines to your exam (including vitamins, minerals and over-the-counter drugs). Also bring the results of similar scans you may have had.  Please remove any body piercings and hair extensions before you arrive.  Follow your doctor s orders. If you do not, we may have to postpone your exam.  You may or may not receive IV contrast for this exam pending the discretion of the Radiologist.  You do not need to do anything special to prepare.  The MRI machine uses a strong magnet. Please wear clothes  without metal (snaps, zippers). A sweatsuit works well, or we may give you a hospital gown.   **IMPORTANT** THE INSTRUCTIONS BELOW ARE ONLY FOR THOSE PATIENTS WHO HAVE BEEN PRESCRIBED SEDATION OR GENERAL ANESTHESIA DURING THEIR MRI PROCEDURE:  IF YOUR DOCTOR PRESCRIBED ORAL SEDATION (take medicine to help you relax during your exam):   You must get the medicine from your doctor (oral medication) before you arrive. Bring the medicine to the exam. Do not take it at home. You ll be told when to take it upon arriving for your exam.   Arrive one hour early. Bring someone who can take you home after the test. Your medicine will make you sleepy. After the exam, you may not drive, take a bus or take a taxi by yourself.  IF YOUR DOCTOR PRESCRIBED IV SEDATION:   Arrive one hour early. Bring someone who can take you home after the test. Your medicine will make you sleepy. After the exam, you may not drive, take a bus or take a taxi by yourself.   No eating 6 hours before your exam. You may have clear liquids up until 4 hours before your exam. (Clear liquids include water, clear tea, black coffee and fruit juice without pulp.)  IF YOUR DOCTOR PRESCRIBED ANESTHESIA (be asleep for your exam):   Arrive 1 1/2 hours early. Bring someone who can take you home after the test. You may not drive, take a bus or take a taxi by yourself.   No eating 8 hours before your exam. You may have clear liquids up until 4 hours before your exam. (Clear liquids include water, clear tea, black coffee and fruit juice without pulp.)   You will spend four to five hours in the recovery room.  Please call the Imaging Department at your exam site with any questions.            Sep 10, 2018  8:00 AM CDT   (Arrive by 7:45 AM)   Return Visit with Trudy Rangel MD   [facility] Neurology ([facility] [place])    [address] [address] [phone]              Who to contact     Please call your  "clinic at [phone] to:    Ask questions about your health    Make or cancel appointments    Discuss your medicines    Learn about your test results    Speak to your doctor            Additional Information About Your Visit        [employer] Information     [employer] gives you secure access to your electronic health record. If you see a primary care provider, you can also send messages to your care team and make appointments. If you have questions, please call your primary care clinic.  If you do not have a primary care provider, please call [phone] and they will assist you.      [employer] is an electronic gateway that provides easy, online access to your medical records. With [employer], you can request a clinic appointment, read your test results, renew a prescription or communicate with your care team.     To access your existing account, please contact your [facility] or call [phone] for assistance.        Care EveryWhere ID     This is your Care EveryWhere ID. This could be used by other organizations to access your [place] medical records  [id number]        Your Vitals Were     Pulse Height Last Period             67 1.778 m (5' 10\") 02/16/2018 (Exact Date)          Blood Pressure from Last 3 Encounters:   03/12/18 108/69   03/06/18 112/67   03/05/18 114/67    Weight from Last 3 Encounters:   03/06/18 72.1 kg (159 lb)   03/05/18 71.8 kg (158 lb 6.4 oz)              Today, you had the following     No orders found for display       Primary Care Provider Fax #    Carol Academize [facility] [phone]       [address] [place] [address]        Equal Access to Services     LISA GRAY AH: Hadii aad ku hadasho Soomaali, waaxda luqadaha, qaybta kaalmada adeegyada, shanika lang . So [facility] [phone].    ATENCIÓN: Si habla español, tiene a medrano disposición servicios gratuitos de asistencia lingüística. Llame al [phone].    We comply with " applicable federal civil rights laws and [place] laws. We do not discriminate on the basis of race, color, national origin, age, disability, sex, sexual orientation, or gender identity.            Thank you!     Thank you for choosing [facility] NEUROLOGY  for your care. Our goal is always to provide you with excellent care. Hearing back from our patients is one way we can continue to improve our services. Please take a few minutes to complete the written survey that you may receive in the mail after your visit with us. Thank you!             Your Updated Medication List - Protect others around you: Learn how to safely use, store and throw away your medicines at www.disposemymeds.org.      Notice  As of 3/12/2018 11:18 AM    You have not been prescribed any medications.

## 2018-03-12 NOTE — LETTER
3/12/2018       RE: Ernestina Danielle  [address]   [facility] [address]     Dear Colleague,    Thank you for referring your patient, Ernestina Danielle, to the [facility] NEUROLOGY at [facility]. Please see a copy of my visit note below.    Urgent Return visit    CC: Patient requested appointment after recent ER visit.    HPI: Ernestina is a 27-year-old right-handed woman who was initially seen on March 5, 2018.  At that time the patient had presented with new and different headaches for the span of 4 months.  Pain was occurring when she was lying on her stomach and extending her back.  It was a pressure-like sensation in the back of her head.  The workup for these new headaches included an MRI scan of the brain along with MRA angiography that revealed a small wide neck saccular aneurysm from the right posterior cerebral artery measuring 2 mm in diameter.  A solitary T2 hyperintensity that did not enhance with contrast in the right medial globus of unclear significance and borderline cerebellar tonsillar ectopia measuring 5 mm.    The patient was reassured that all of these findings while considered abnormal are not life-threatening and we had planned to follow-up in 6 months time with a repeat head image study.    The next day the patient developed some numbness in the left side of her face that spread to the right side of the face.  She describes a sensation of stiffness in her face.  Tingling then proceeded to spread to her arms and her legs and she presented to the emergency department.  She did not have a headache during this time.  She did feel as though her coordination was somewhat impaired.  In the emergency department the patient had a CT scan of the head as well as CT angiography of the head and neck with no don abnormality seen.  The patient was discharged and instructed to follow-up with me.    The patient has had complete resolution of symptoms at this time.   "The numbness in the face took 4 days to resolve.    No change in patient's past medical history, social history, family history from prior visit 1 week ago.    Review of systems please see HPI all of the systems reviewed and negative    Physical examination:  /69 (BP Location: Right arm, Patient Position: Chair, Cuff Size: Adult Regular)  Pulse 67  Ht 1.778 m (5' 10\")  LMP 02/16/2018 (Exact Date)  Gen; awake, alert, NAD  Neuro: The patient is oriented ×3.  No aphasia or dysarthria.  Attention recall intact.  Cranial nerves II through XII intact with normal corneal reflex.  The patient has normal sensation to light touch and pinprick in all 4 extremities.  Reflexes are normal bilaterally symmetric and plantar responses are flexor.  Gait examination is stable.  Vascular examination: Temporal artery pulses are normal bilaterally symmetric.  There are no carotid bruits.  Heart is regular in rate and rhythm.    Impression/Recommendations:  The patient had an episode of facial numbness in the setting of being under a lot of stress.  Symptoms have now completely resolved and examination is normal.  At this time have to question whether this is related to her anxiety.  Alternate explanations could be some sort of migraine variant.  In the future the patient will treat as though it is migraine if the symptoms recur.  If they continue she will contact me.    Consideration for repeat MRI scan of the brain was undertaken but considering that she just recently had one in February and had CT scan images in March that were normal I do not think it is necessary at this time.  We will continue with the initial plan of the repeat MRI scan of the brain in September.    If the patient were to have further neurologic symptoms repeat MRI scan of the brain would be important to evaluate for ischemic lesions.    15 minutes with the patient over 50% counseling.    Trudy Rangel MD [facility]  Department of Neurology    "

## 2018-08-10 ASSESSMENT — ENCOUNTER SYMPTOMS
MYALGIAS: 0
LOSS OF CONSCIOUSNESS: 0
DECREASED CONCENTRATION: 0
JOINT SWELLING: 0
TROUBLE SWALLOWING: 0
SPEECH CHANGE: 0
PANIC: 0
PARALYSIS: 0
HOARSE VOICE: 0
NERVOUS/ANXIOUS: 1
HEADACHES: 1
SORE THROAT: 0
SINUS PAIN: 0
STIFFNESS: 0
NECK MASS: 0
SEIZURES: 0
DISTURBANCES IN COORDINATION: 0
INSOMNIA: 1
NECK PAIN: 1
MEMORY LOSS: 0
SMELL DISTURBANCE: 0
TINGLING: 1
DIZZINESS: 0
MUSCLE CRAMPS: 0
DEPRESSION: 0
BACK PAIN: 1
MUSCLE WEAKNESS: 0
WEAKNESS: 0
TASTE DISTURBANCE: 0
SINUS CONGESTION: 0
ARTHRALGIAS: 0
NUMBNESS: 0
TREMORS: 0

## 2018-08-24 ENCOUNTER — OFFICE VISIT (OUTPATIENT)
Dept: INTERNAL MEDICINE | Facility: CLINIC | Age: 28
End: 2018-08-24
Payer: COMMERCIAL

## 2018-08-24 VITALS
HEART RATE: 76 BPM | OXYGEN SATURATION: 96 % | BODY MASS INDEX: 24.02 KG/M2 | SYSTOLIC BLOOD PRESSURE: 116 MMHG | DIASTOLIC BLOOD PRESSURE: 74 MMHG | WEIGHT: 167.4 LBS

## 2018-08-24 DIAGNOSIS — R51.9 NONINTRACTABLE HEADACHE, UNSPECIFIED CHRONICITY PATTERN, UNSPECIFIED HEADACHE TYPE: Primary | ICD-10-CM

## 2018-08-24 ASSESSMENT — PAIN SCALES - GENERAL: PAINLEVEL: NO PAIN (0)

## 2018-08-24 NOTE — NURSING NOTE
Chief Complaint   Patient presents with     Establish Care     Pt is here to establish a new PCP.      Waleska Uriarte LPN at 1:45 PM on 8/24/2018.

## 2018-08-24 NOTE — MR AVS SNAPSHOT
After Visit Summary   8/24/2018    Ernestina Danielle    MRN: [id number]           Patient Information     Date Of Birth          1990        Visit Information        Provider Department      8/24/2018 2:00 PM Vanesa Jain MD [facility]        Care Instructions    [facility] Medication Refill Request Information:  * Please contact your pharmacy regarding ANY request for medication refills.  ** [facility] Prescription Fax = [phone]  * Please allow 3 business days for routine medication refills.  * Please allow 5 business days for controlled substance medication refills.     [facility] Test Result notification information:  *You will be notified with in 7-10 days of your appointment day regarding the results of your test.  If you are on MyChart you will be notified as soon as the provider has reviewed the results and signed off on them.    [facility]: [phone]           Follow-ups after your visit        Your next 10 appointments already scheduled     Sep 06, 2018  7:00 AM CDT   MR BRAIN W/O CONTRAST with [id number]   [facility] MRI ([facility] [place])    [address] [place] [address] [phone]           Take your medicines as usual, unless your doctor tells you not to. Bring a list of your current medicines to your exam (including vitamins, minerals and over-the-counter drugs). Also bring the results of similar scans you may have had.  Please remove any body piercings and hair extensions before you arrive.  Follow your doctor s orders. If you do not, we may have to postpone your exam.  You may or may not receive IV contrast for this exam pending the discretion of the Radiologist.  You do not need to do anything special to prepare.  The MRI machine uses a strong magnet. Please wear clothes without metal (snaps, zippers). A sweatsuit works well, or we may give you a hospital gown.    **IMPORTANT** THE INSTRUCTIONS BELOW ARE ONLY FOR THOSE PATIENTS WHO HAVE BEEN PRESCRIBED SEDATION OR GENERAL ANESTHESIA DURING THEIR MRI PROCEDURE:  IF YOUR DOCTOR PRESCRIBED ORAL SEDATION (take medicine to help you relax during your exam):   You must get the medicine from your doctor (oral medication) before you arrive. Bring the medicine to the exam. Do not take it at home. You ll be told when to take it upon arriving for your exam.   Arrive one hour early. Bring someone who can take you home after the test. Your medicine will make you sleepy. After the exam, you may not drive, take a bus or take a taxi by yourself.  IF YOUR DOCTOR PRESCRIBED IV SEDATION:   Arrive one hour early. Bring someone who can take you home after the test. Your medicine will make you sleepy. After the exam, you may not drive, take a bus or take a taxi by yourself.   No eating 6 hours before your exam. You may have clear liquids up until 4 hours before your exam. (Clear liquids include water, clear tea, black coffee and fruit juice without pulp.)  IF YOUR DOCTOR PRESCRIBED ANESTHESIA (be asleep for your exam):   Arrive 1 1/2 hours early. Bring someone who can take you home after the test. You may not drive, take a bus or take a taxi by yourself.   No eating 8 hours before your exam. You may have clear liquids up until 4 hours before your exam. (Clear liquids include water, clear tea, black coffee and fruit juice without pulp.)   You will spend four to five hours in the recovery room.  Please call the Imaging Department at your exam site with any questions.            Sep 06, 2018  7:45 AM CDT   MR BRAIN & ANGIORAM with [id number]   [facility] ([facility])    [address] [address] [phone]           Take your medicines as usual, unless your doctor tells you not to. Bring a list of your current medicines to your exam (including vitamins, minerals and  over-the-counter drugs). Also bring the results of similar scans you may have had.  Please remove any body piercings and hair extensions before you arrive.  Follow your doctor s orders. If you do not, we may have to postpone your exam.  You may or may not receive IV contrast for this exam pending the discretion of the Radiologist.  You do not need to do anything special to prepare.  The MRI machine uses a strong magnet. Please wear clothes without metal (snaps, zippers). A sweatsuit works well, or we may give you a hospital gown.   **IMPORTANT** THE INSTRUCTIONS BELOW ARE ONLY FOR THOSE PATIENTS WHO HAVE BEEN PRESCRIBED SEDATION OR GENERAL ANESTHESIA DURING THEIR MRI PROCEDURE:  IF YOUR DOCTOR PRESCRIBED ORAL SEDATION (take medicine to help you relax during your exam):   You must get the medicine from your doctor (oral medication) before you arrive. Bring the medicine to the exam. Do not take it at home. You ll be told when to take it upon arriving for your exam.   Arrive one hour early. Bring someone who can take you home after the test. Your medicine will make you sleepy. After the exam, you may not drive, take a bus or take a taxi by yourself.  IF YOUR DOCTOR PRESCRIBED IV SEDATION:   Arrive one hour early. Bring someone who can take you home after the test. Your medicine will make you sleepy. After the exam, you may not drive, take a bus or take a taxi by yourself.   No eating 6 hours before your exam. You may have clear liquids up until 4 hours before your exam. (Clear liquids include water, clear tea, black coffee and fruit juice without pulp.)  IF YOUR DOCTOR PRESCRIBED ANESTHESIA (be asleep for your exam):   Arrive 1 1/2 hours early. Bring someone who can take you home after the test. You may not drive, take a bus or take a taxi by yourself.   No eating 8 hours before your exam. You may have clear liquids up until 4 hours before your exam. (Clear liquids include water, clear tea, black coffee and fruit juice  without pulp.)   You will spend four to five hours in the recovery room.  Please call the Imaging Department at your exam site with any questions.            Sep 10, 2018  8:00 AM CDT   (Arrive by 7:45 AM)   Return Visit with Trudy Rangel MD   [facility] Neurology ([facility] [place])    [address] [facility]  3rd [facility] [id number] [phone]              Who to contact     Please call your clinic at [phone] to:    Ask questions about your health    Make or cancel appointments    Discuss your medicines    Learn about your test results    Speak to your doctor            Additional Information About Your Visit        [employer] Information     [employer] gives you secure access to your electronic health record. If you see a primary care provider, you can also send messages to your care team and make appointments. If you have questions, please call your primary care clinic.  If you do not have a primary care provider, please call [phone] and they will assist you.      [employer] is an electronic gateway that provides easy, online access to your medical records. With [employer], you can request a clinic appointment, read your test results, renew a prescription or communicate with your care team.     To access your existing account, please contact your [facility] or call [phone] for assistance.        Care EveryWhere ID     This is your Care EveryWhere ID. This could be used by other organizations to access your [place] medical records  [id number]        Your Vitals Were     Pulse Last Period Pulse Oximetry Breastfeeding? BMI (Body Mass Index)       76 08/09/2018 (Exact Date) 96% No 24.02 kg/m2        Blood Pressure from Last 3 Encounters:   08/24/18 116/74   03/12/18 108/69   03/06/18 112/67    Weight from Last 3 Encounters:   08/24/18 75.9 kg (167 lb 6.4 oz)   03/06/18 72.1 kg (159 lb)   03/05/18 71.8 kg (158 lb 6.4 oz)               Today, you had the following     No orders found for display       Primary Care Provider Office Phone # Fax #    Vanesa Linn Nunes [phone] [address] [address]        Equal Access to Services     AMANDAJADYN ISAAC : Hadii aad ku hadgopio Soomaali, waaxda luqadaha, qaybta kaalmada adeegyada, waxay idiin hayaan adeeg khanthonysh lamelissa andrews. So [facility] [phone].    ATENCIÓN: Si habla español, tiene a medrano disposición servicios gratuitos de asistencia lingüística. Llame al [phone].    We comply with applicable federal civil rights laws and [place] laws. We do not discriminate on the basis of race, color, national origin, age, disability, sex, sexual orientation, or gender identity.            Thank you!     Thank you for choosing [facility]  for your care. Our goal is always to provide you with excellent care. Hearing back from our patients is one way we can continue to improve our services. Please take a few minutes to complete the written survey that you may receive in the mail after your visit with us. Thank you!             Your Updated Medication List - Protect others around you: Learn how to safely use, store and throw away your medicines at www.disposemymeds.org.          This list is accurate as of 8/24/18  2:24 PM.  Always use your most recent med list.                   Brand Name Dispense Instructions for use Diagnosis    ZYRTEC ALLERGY PO      Take by mouth daily

## 2018-08-24 NOTE — PATIENT INSTRUCTIONS
[facility] Medication Refill Request Information:  * Please contact your pharmacy regarding ANY request for medication refills.  ** [facility] Prescription Fax = [phone]  * Please allow 3 business days for routine medication refills.  * Please allow 5 business days for controlled substance medication refills.     [facility] Test Result notification information:  *You will be notified with in 7-10 days of your appointment day regarding the results of your test.  If you are on MyChart you will be notified as soon as the provider has reviewed the results and signed off on them.    [facility]: [phone]

## 2018-08-28 ASSESSMENT — ENCOUNTER SYMPTOMS
SEIZURES: 0
EYE REDNESS: 1
FATIGUE: 1
ORTHOPNEA: 0
MUSCLE CRAMPS: 1
POLYDIPSIA: 0
PANIC: 0
INSOMNIA: 1
EYE WATERING: 0
HYPOTENSION: 0
MYALGIAS: 1
JOINT SWELLING: 0
HYPERTENSION: 0
ALTERED TEMPERATURE REGULATION: 0
NERVOUS/ANXIOUS: 1
NUMBNESS: 0
WEAKNESS: 0
ARTHRALGIAS: 0
PALPITATIONS: 0
MUSCLE WEAKNESS: 0
SYNCOPE: 0
DECREASED APPETITE: 0
DIZZINESS: 1
CHILLS: 0
NECK PAIN: 1
LOSS OF CONSCIOUSNESS: 0
FEVER: 0
BACK PAIN: 1
DEPRESSION: 0
HEADACHES: 1
LIGHT-HEADEDNESS: 1
EXERCISE INTOLERANCE: 0
LEG PAIN: 0
NIGHT SWEATS: 0
WEIGHT GAIN: 0
SLEEP DISTURBANCES DUE TO BREATHING: 0
MEMORY LOSS: 0
DOUBLE VISION: 0
DECREASED CONCENTRATION: 0
PARALYSIS: 0
POLYPHAGIA: 0
EYE IRRITATION: 1
WEIGHT LOSS: 0
SPEECH CHANGE: 0
INCREASED ENERGY: 0
TREMORS: 0
EYE PAIN: 0
DISTURBANCES IN COORDINATION: 0
TINGLING: 1
HALLUCINATIONS: 0
STIFFNESS: 0

## 2018-08-31 NOTE — PROGRESS NOTES
Ernestina is a 27 year old female who is in generally good health but is being followed for a cerebral aneurysm discovered on imaging done for history of headaches.  She used to follow at [facility] but thought that an internist might be a better fit.  She still has mild intermittent positional headaches and was seen once for numbness and tingling left side of the face.  Otherwise no symptoms are new or changing.    No changes to past, family, or social history.  ROS: 10 point ROS neg other than the symptoms noted above in the HPI.      PHYSICAL EXAM:  B/P: 116/74, P: 76  Constitutional: no distress, comfortable, pleasant   Eyes: anicteric, normal extra-ocular movements   Ears, Nose and Throat: tympanic membranes clear, nose clear and free of lesions, throat clear, neck supple with full range of motion, no thyromegaly.   Cardiovascular: regular rate and rhythm, normal S1 and S2, no murmurs, rubs or gallops, peripheral pulses full and symmetric   Respiratory: clear to auscultation, no wheezes or crackles, normal breath sounds   Gastrointestinal: positive bowel sounds, nontender, no hepatosplenomegaly, no masses   Musculoskeletal: knees full range of motion, no effusions  Skin: no concerning lesions, no rashes or jaundice   Neurological: normal gait, no tremor   Psychological: appropriate mood   Lymphatic: no cervical lymphadenopathy    A/P  27 year old here to establish care, routine physical exam and f/u headaches.  I made no changes to the current plan today.  She does have follow up with Neurology this fall.  RTC one year or sooner prn    -- Vanesa Basilio MD

## 2018-09-06 ENCOUNTER — RADIANT APPOINTMENT (OUTPATIENT)
Dept: MRI IMAGING | Facility: CLINIC | Age: 28
End: 2018-09-06
Attending: PSYCHIATRY & NEUROLOGY
Payer: COMMERCIAL

## 2018-09-06 DIAGNOSIS — R93.0 ABNORMAL MRI OF HEAD: ICD-10-CM

## 2018-09-06 DIAGNOSIS — I67.1 CEREBRAL ANEURYSM, NONRUPTURED: ICD-10-CM

## 2018-09-10 ENCOUNTER — OFFICE VISIT (OUTPATIENT)
Dept: NEUROLOGY | Facility: CLINIC | Age: 28
End: 2018-09-10
Payer: COMMERCIAL

## 2018-09-10 VITALS
HEIGHT: 70 IN | DIASTOLIC BLOOD PRESSURE: 63 MMHG | HEART RATE: 59 BPM | BODY MASS INDEX: 23.91 KG/M2 | OXYGEN SATURATION: 98 % | WEIGHT: 167 LBS | SYSTOLIC BLOOD PRESSURE: 111 MMHG

## 2018-09-10 DIAGNOSIS — R93.0 ABNORMAL MRI OF HEAD: ICD-10-CM

## 2018-09-10 DIAGNOSIS — I67.1 CEREBRAL ANEURYSM, NONRUPTURED: ICD-10-CM

## 2018-09-10 DIAGNOSIS — R51.9 NONINTRACTABLE HEADACHE, UNSPECIFIED CHRONICITY PATTERN, UNSPECIFIED HEADACHE TYPE: Primary | ICD-10-CM

## 2018-09-10 RX ORDER — IBUPROFEN 200 MG
600 TABLET ORAL EVERY 4 HOURS PRN
Qty: 100 TABLET | Refills: 11 | COMMUNITY
Start: 2018-09-10

## 2018-09-10 ASSESSMENT — PAIN SCALES - GENERAL: PAINLEVEL: NO PAIN (0)

## 2018-09-10 NOTE — MR AVS SNAPSHOT
After Visit Summary   9/10/2018    Ernestina Danielle    MRN: [id number]           Patient Information     Date Of Birth          1990        Visit Information        Provider Department      9/10/2018 8:00 AM Trudy Rangel MD [facility] Neurology        Today's Diagnoses     Nonintractable headache, unspecified chronicity pattern, unspecified headache type    -  1    Cerebral aneurysm, nonruptured        Abnormal MRI of head           Follow-ups after your visit        Follow-up notes from your care team     Return in about 1 year (around 9/10/2019) for Routine Visit.      Future tests that were ordered for you today     Open Future Orders        Priority Expected Expires Ordered    Lipid Profile FASTING Routine 9/10/2018 9/10/2019 9/10/2018            Who to contact     Please call your clinic at [phone] to:    Ask questions about your health    Make or cancel appointments    Discuss your medicines    Learn about your test results    Speak to your doctor            Additional Information About Your Visit        [employer] Information     [employer]Ad gives you secure access to your electronic health record. If you see a primary care provider, you can also send messages to your care team and make appointments. If you have questions, please call your primary care clinic.  If you do not have a primary care provider, please call [phone] and they will assist you.      xAd is an electronic gateway that provides easy, online access to your medical records. With xAd, you can request a clinic appointment, read your test results, renew a prescription or communicate with your care team.     To access your existing account, please contact your [facility] or call [phone] for assistance.        Care EveryWhere ID     This is your Care EveryWhere ID. This could be used by other organizations to access your [place] medical records  [id number]        Your  "Vitals Were     Pulse Height Last Period Pulse Oximetry BMI (Body Mass Index)       59 1.778 m (5' 10\") 09/06/2018 98% 23.96 kg/m2        Blood Pressure from Last 3 Encounters:   09/10/18 111/63   08/24/18 116/74   03/12/18 108/69    Weight from Last 3 Encounters:   09/10/18 75.8 kg (167 lb)   08/24/18 75.9 kg (167 lb 6.4 oz)   03/06/18 72.1 kg (159 lb)              Today, you had the following     No orders found for display         Today's Medication Changes          These changes are accurate as of 9/10/18  8:40 AM.  If you have any questions, ask your nurse or doctor.               Start taking these medicines.        Dose/Directions    ibuprofen 200 MG tablet   Commonly known as:  EQ IBUPROFEN   Used for:  Nonintractable headache, unspecified chronicity pattern, unspecified headache type   Started by:  Trudy Rangel MD        Dose:  600 mg   Take 3 tablets (600 mg) by mouth every 4 hours as needed for mild pain   Quantity:  100 tablet   Refills:  11            Where to get your medicines      Some of these will need a paper prescription and others can be bought over the counter.  Ask your nurse if you have questions.     You don't need a prescription for these medications     ibuprofen 200 MG tablet                Primary Care Provider Office Phone # Fax #    Vanesa Linn Nunes [phone] [address] [address]        Equal Access to Services     [facility]: Hadii guillaume chungo Soophelia, waaxda luqadaha, qaybta kaalmada erenyada, waxay brayan ledbetter adejerica andrews. So [facility] [phone].    ATENCIÓN: Si habla español, tiene a medrano disposición servicios gratuitos de asistencia lingüística. Llame al [phone].    We comply with applicable federal civil rights laws and [place] laws. We do not discriminate on the basis of race, color, national origin, age, disability, sex, sexual orientation, or gender identity.            Thank you!  "    Thank you for choosing [facility] NEUROLOGY  for your care. Our goal is always to provide you with excellent care. Hearing back from our patients is one way we can continue to improve our services. Please take a few minutes to complete the written survey that you may receive in the mail after your visit with us. Thank you!             Your Updated Medication List - Protect others around you: Learn how to safely use, store and throw away your medicines at www.disposemymeds.org.          This list is accurate as of 9/10/18  8:40 AM.  Always use your most recent med list.                   Brand Name Dispense Instructions for use Diagnosis    ibuprofen 200 MG tablet    EQ IBUPROFEN    100 tablet    Take 3 tablets (600 mg) by mouth every 4 hours as needed for mild pain    Nonintractable headache, unspecified chronicity pattern, unspecified headache type       ZYRTEC ALLERGY PO      Take by mouth daily

## 2018-09-10 NOTE — NURSING NOTE
Chief Complaint   Patient presents with     Follow Up For     6 motns follow up on headaches and mri     Corrina Parikh

## 2018-09-10 NOTE — PROGRESS NOTES
[facility] Department of Neurology    Reason for visit: Recheck    HPI:    Ernestina is a 27-year-old woman who has started having intermittent migraines with a aura of numbness and tingling on the left side of her face followed by about 6 hours of headache.  This has not responded well to Excedrin.  Earlier on she was overusing ibuprofen and has discontinued her use as well as cut back on her caffeine.  The patient has been offered migraine preventive medication including Topamax and nortriptyline but she declines taking this medication due to concern over side effects.    During the workup for these intermittent migraines the patient was discovered to have some abnormal findings on MRI scan of the brain.  The first was a low-lying cerebellar tonsil finding with 6 mm of cerebellar ectopia.  The second was an abnormal T2 hyperintensity in the right thalamus and the third was a possible aneurysm in the right posterior cerebral artery P1 segment that was categorized as small.    The patient returns today for six-month recheck having repeated her MRI scan of the brain and MR angiography.    Findings of the MRI scan of the brain from September 6 showed a redemonstration of the low positioning cerebellar tonsils but no mass-effect of the mid Dula or compression of the foramen magnum.  This was measured at 6 mm and is felt to be incidental.  Again a solitary tiny focus of T2 hyperintensity was seen in the right lateral thalamus of unclear significance and MR angiography of the head at this time showed no evidence of any aneurysm.    Impression/Recommendations:    1.  Migraine headache with aura  The patient will use ibuprofen as needed for her migraines.  If her use increases to 2 times or more a week she needs to consider starting a preventive medicine.  Her migraines sound somewhat disabling causing her to have to leave work early in the past 3 months 2 times.  It also disrupts with her ability to do household  chores.  In that setting I personally would recommend she try a migraine preventive but she is very hesitant regarding possible side effects.    2.  Abnormal MRI scan of the brain  The patient had an abnormal MRI scan of the brain showing low-lying tonsils it is felt to be a normal variant and not causing her headaches.  In addition she has an abnormal T2 hyperintensity of the thalamus which is of unclear etiology and unchanged.  No sign of multiple sclerosis.    3.  Possible cerebral artery aneurysm    On first round of head imaging there was a possible cerebral artery aneurysm found in the right posterior cerebral artery P1 segment.  This was only seen on MR angiography and not CT angiography.  Repeat MR angiography in September again does not show this.  At this point time I would recommend one more time imaging in one years time.  If again absent we will delete this finding from her chart.    25 minutes spent with patient over 50% counseling    Trudy Rangel MD [facility]  Department of Neurology  Pager [phone]        Answers for HPI/ROS submitted by the patient on 8/28/2018   General Symptoms: Yes  Skin Symptoms: No  HENT Symptoms: No  EYE SYMPTOMS: Yes  HEART SYMPTOMS: Yes  LUNG SYMPTOMS: No  INTESTINAL SYMPTOMS: No  URINARY SYMPTOMS: No  GYNECOLOGIC SYMPTOMS: No  BREAST SYMPTOMS: No  SKELETAL SYMPTOMS: Yes  BLOOD SYMPTOMS: No  NERVOUS SYSTEM SYMPTOMS: Yes  MENTAL HEALTH SYMPTOMS: Yes  Fever: No  Loss of appetite: No  Weight loss: No  Weight gain: No  Fatigue: Yes  Night sweats: No  Chills: No  Increased stress: Yes  Excessive hunger: No  Excessive thirst: No  Feeling hot or cold when others believe the temperature is normal: No  Loss of height: No  Post-operative complications: No  Surgical site pain: No  Hallucinations: No  Change in or Loss of Energy: No  Hyperactivity: No  Confusion: No  Eye pain: No  Dry eyes: Yes  Watery eyes: No  Eye bulging: No  Double vision: No  Flashing of lights: No  Spots:  No  Floaters: No  Redness: Yes  Crossed eyes: No  Tunnel Vision: No  Yellowing of eyes: No  Eye irritation: Yes  Chest pain or pressure: No  Fast or irregular heartbeat: No  Pain in legs with walking: No  Trouble breathing while lying down: No  Fingers or toes appear blue: No  High blood pressure: No  Low blood pressure: No  Fainting: No  Murmurs: No  Pacemaker: No  Varicose veins: No  Edema or swelling: No  Wake up at night with shortness of breath: No  Light-headedness: Yes  Exercise intolerance: No  Back pain: Yes  Muscle aches: Yes  Neck pain: Yes  Swollen joints: No  Joint pain: No  Bone pain: No  Muscle cramps: Yes  Muscle weakness: No  Joint stiffness: No  Bone fracture: No  Trouble with coordination: No  Dizziness or trouble with balance: Yes  Fainting or black-out spells: No  Memory loss: No  Headache: Yes  Seizures: No  Speech problems: No  Tingling: Yes  Tremor: No  Weakness: No  Difficulty walking: No  Paralysis: No  Numbness: No  Nervous or Anxious: Yes  Depression: No  Trouble sleeping: Yes  Trouble thinking or concentrating: No  Mood changes: No  Panic attacks: No

## 2018-09-10 NOTE — LETTER
9/10/2018       RE: Ernestina Danielle  [address]  [address]  [place] [address]     Dear Colleague,    Thank you for referring your patient, Ernestina Danielle, to the [facility] NEUROLOGY at [facility]. Please see a copy of my visit note below.    [facility] Department of Neurology    Reason for visit: Recheck    HPI:    Ernestina is a 27-year-old woman who has started having intermittent migraines with a aura of numbness and tingling on the left side of her face followed by about 6 hours of headache.  This has not responded well to Excedrin.  Earlier on she was overusing ibuprofen and has discontinued her use as well as cut back on her caffeine.  The patient has been offered migraine preventive medication including Topamax and nortriptyline but she declines taking this medication due to concern over side effects.    During the workup for these intermittent migraines the patient was discovered to have some abnormal findings on MRI scan of the brain.  The first was a low-lying cerebellar tonsil finding with 6 mm of cerebellar ectopia.  The second was an abnormal T2 hyperintensity in the right thalamus and the third was a possible aneurysm in the right posterior cerebral artery P1 segment that was categorized as small.    The patient returns today for six-month recheck having repeated her MRI scan of the brain and MR angiography.    Findings of the MRI scan of the brain from September 6 showed a redemonstration of the low positioning cerebellar tonsils but no mass-effect of the mid Dula or compression of the foramen magnum.  This was measured at 6 mm and is felt to be incidental.  Again a solitary tiny focus of T2 hyperintensity was seen in the right lateral thalamus of unclear significance and MR angiography of the head at this time showed no evidence of any aneurysm.    Impression/Recommendations:    1.  Migraine headache with aura  The patient will use ibuprofen as needed  for her migraines.  If her use increases to 2 times or more a week she needs to consider starting a preventive medicine.  Her migraines sound somewhat disabling causing her to have to leave work early in the past 3 months 2 times.  It also disrupts with her ability to do household chores.  In that setting I personally would recommend she try a migraine preventive but she is very hesitant regarding possible side effects.    2.  Abnormal MRI scan of the brain  The patient had an abnormal MRI scan of the brain showing low-lying tonsils it is felt to be a normal variant and not causing her headaches.  In addition she has an abnormal T2 hyperintensity of the thalamus which is of unclear etiology and unchanged.  No sign of multiple sclerosis.    3.  Possible cerebral artery aneurysm    On first round of head imaging there was a possible cerebral artery aneurysm found in the right posterior cerebral artery P1 segment.  This was only seen on MR angiography and not CT angiography.  Repeat MR angiography in September again does not show this.  At this point time I would recommend one more time imaging in one years time.  If again absent we will delete this finding from her chart.    25 minutes spent with patient over 50% counseling      Again, thank you for allowing me to participate in the care of your patient.      Sincerely,    Trudy Rangel MD

## 2019-07-23 ENCOUNTER — ANCILLARY PROCEDURE (OUTPATIENT)
Dept: ULTRASOUND IMAGING | Facility: CLINIC | Age: 29
End: 2019-07-23
Attending: PHYSICIAN ASSISTANT
Payer: COMMERCIAL

## 2019-07-23 DIAGNOSIS — R10.2 PELVIC PAIN IN FEMALE: ICD-10-CM

## 2019-08-28 ENCOUNTER — ANCILLARY PROCEDURE (OUTPATIENT)
Dept: MRI IMAGING | Facility: CLINIC | Age: 29
End: 2019-08-28
Attending: PSYCHIATRY & NEUROLOGY
Payer: COMMERCIAL

## 2019-08-28 DIAGNOSIS — R93.0 ABNORMAL MRI OF HEAD: ICD-10-CM

## 2019-08-28 DIAGNOSIS — I67.1 CEREBRAL ANEURYSM, NONRUPTURED: ICD-10-CM

## 2019-09-03 ENCOUNTER — OFFICE VISIT (OUTPATIENT)
Dept: NEUROLOGY | Facility: CLINIC | Age: 29
End: 2019-09-03
Payer: COMMERCIAL

## 2019-09-03 VITALS
HEIGHT: 70 IN | HEART RATE: 61 BPM | OXYGEN SATURATION: 98 % | DIASTOLIC BLOOD PRESSURE: 73 MMHG | TEMPERATURE: 98.2 F | BODY MASS INDEX: 23.95 KG/M2 | RESPIRATION RATE: 16 BRPM | WEIGHT: 167.3 LBS | SYSTOLIC BLOOD PRESSURE: 108 MMHG

## 2019-09-03 DIAGNOSIS — I67.1 CEREBRAL ANEURYSM, NONRUPTURED: Primary | ICD-10-CM

## 2019-09-03 DIAGNOSIS — G43.109 MIGRAINE WITH AURA AND WITHOUT STATUS MIGRAINOSUS, NOT INTRACTABLE: ICD-10-CM

## 2019-09-03 PROBLEM — G43.909 MIGRAINE: Status: ACTIVE | Noted: 2019-09-03

## 2019-09-03 ASSESSMENT — MIFFLIN-ST. JEOR: SCORE: 1574.12

## 2019-09-03 ASSESSMENT — PAIN SCALES - GENERAL: PAINLEVEL: NO PAIN (0)

## 2019-09-03 NOTE — NURSING NOTE
Chief Complaint   Patient presents with     Headache     [facility] RETURN     Results     MRI     Bettie Lim, CMA

## 2019-09-03 NOTE — LETTER
9/3/2019       RE: Ernestina Danielle  [address]  [address]  [place] [address]     Dear Colleague,    Thank you for referring your patient, Ernestina Danielle, to the [facility] NEUROLOGY at [facility]. Please see a copy of my visit note below.        [facility]    Ernestina Danielle MRN# [id number]   Age: 28 year old YOB: 1990     Requesting physician: Referred Self  Vanesa Jain            Assessment and Plan:   Assessment:  1.  Migraine with aura  2.  Exertional headache  3.  Abnormal MRI scan initially diagnosed as a posterior cerebral artery aneurysm not seen on the past 2 images  4.  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  I do not think I would recommend repeat imaging unless headaches become worse or she has new neurologic symptoms.  It does not appear as though there is an aneurysm in the initial finding on MR angiography was most likely an over call.  The most recent imaging x2 has been negative    Follow-up with me will be as needed.    The patient was counseled the progesterone only birth control would be ideal.    rTudy Rangel MD [facility]  Department of Neurology  Pager [phone]             History of Present Illness:   CC: Recheck    Ernestina is a 28-year-old woman who comes in for a yearly follow-up.  A year ago she started having migraines with aura with numbness and tingling involving the left side of her face.  It responded quite well to Excedrin and has actually calmed down since that point in time.  During the work-up for this the patient had an MRI scan which on 1.5 Stacey imaging suggested low lying cerebellar tonsils as well as a possible right posterior cerebral artery aneurysm.  The patient was followed with a 3 Stacey MRI scan of the brain and MR angiography of the head which showed no evidence of  "aneurysm 6 months later.  She then had a repeat MR angiography just this past month which again demonstrates no aneurysm.  We did not recheck MRI scan of the brain to check on the cerebellar tonsils which had been stable at 6 mm with both prior imaging.    The patient reports that the only time she notices a headache is after vigorous exercise.  It does not happen all the time for example she went on a 9 mile run without triggering headache this weekend.  This does respond to ibuprofen or Tylenol.           Physical Exam:   /73 (BP Location: Left arm, Patient Position: Sitting)   Pulse 61   Temp 98.2  F (36.8  C) (Oral)   Resp 16   Ht 1.778 m (5' 10\")   Wt 75.9 kg (167 lb 4.8 oz)   SpO2 98%   BMI 24.01 kg/m     General appearance: The patient is well-groomed and cooperative with examination.  Neurological examination is deferred         Data:   All laboratory data reviewed  All imaging studies reviewed by me       Examination: MR angiogram of the head without intravenous contrast  dated 8/28/2019.     COMPARISON: None.     HISTORY: History of cerebral aneurysm.     TECHNIQUE: Volumetric images were obtained using time-of-flight  technique without the use of intravenous contrast and including the  head from the supraorbital region the base of skull.     FINDINGS: There is no evidence for an arterial aneurysm, previously  described P1 aneurysm is not visualized. There are no vascular  malformations. The arteries of the [place] appear normal.                                                                      IMPRESSION: Normal MR angiogram of the head with no evidence for  intracranial arterial aneurysm.     JOSE VALDES MD      DATA for DOCUMENTATION:         Past Medical History:     Patient Active Problem List   Diagnosis     Cerebral aneurysm, nonruptured     Abnormal MRI of head     Nonintractable headache, unspecified chronicity pattern, unspecified headache type     Past Medical " History:   Diagnosis Date     Allergic rhinitis      Asthma      Migraine        Also see scanned health assessment forms.       Past Surgical History:     Past Surgical History:   Procedure Laterality Date     TONSILLECTOMY  1998            Social History:     Social History     Socioeconomic History     Marital status: Single     Spouse name: Not on file     Number of children: Not on file     Years of education: Not on file     Highest education level: Not on file   Occupational History     Not on file   Social Needs     Financial resource strain: Not on file     Food insecurity:     Worry: Not on file     Inability: Not on file     Transportation needs:     Medical: Not on file     Non-medical: Not on file   Tobacco Use     Smoking status: Never Smoker     Smokeless tobacco: Never Used   Substance and Sexual Activity     Alcohol use: No     Drug use: No     Sexual activity: Never   Lifestyle     Physical activity:     Days per week: Not on file     Minutes per session: Not on file     Stress: Not on file   Relationships     Social connections:     Talks on phone: Not on file     Gets together: Not on file     Attends [employer] service: Not on file     Active member of club or organization: Not on file     Attends meetings of clubs or organizations: Not on file     Relationship status: Not on file     Intimate partner violence:     Fear of current or ex partner: Not on file     Emotionally abused: Not on file     Physically abused: Not on file     Forced sexual activity: Not on file   Other Topics Concern     Parent/sibling w/ CABG, MI or angioplasty before 65F 55M? Not Asked   Social History Narrative     Not on file            Family History:     Family History   Problem Relation Age of Onset     Breast Cancer Mother      Hypertension Mother      Hyperlipidemia Mother             Medications:     Current Outpatient Medications   Medication Sig     Cetirizine HCl (ZYRTEC ALLERGY PO) Take by mouth daily      ibuprofen (EQ IBUPROFEN) 200 MG tablet Take 3 tablets (600 mg) by mouth every 4 hours as needed for mild pain     No current facility-administered medications for this visit.             Review of Systems:   A comprehensive 10 point review of systems (constitutional, ENT, cardiac, peripheral vascular, lymphatic, respiratory, GI, , Musculoskeletal, skin, Neurological) was performed and found to be negative except as described in this note.     See intake form completed by patient    Again, thank you for allowing me to participate in the care of your patient.      Sincerely,    Trudy Rangel MD

## 2019-09-03 NOTE — PROGRESS NOTES
[facility]    Ernestina Danielle MRN# [id number]   Age: 28 year old YOB: 1990     Requesting physician: Referred Self  Vanesa Jain            Assessment and Plan:   Assessment:  1.  Migraine with aura  2.  Exertional headache  3.  Abnormal MRI scan initially diagnosed as a posterior cerebral artery aneurysm not seen on the past 2 images  4.  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  I do not think I would recommend repeat imaging unless headaches become worse or she has new neurologic symptoms.  It does not appear as though there is an aneurysm in the initial finding on MR angiography was most likely an over call.  The most recent imaging x2 has been negative    Follow-up with me will be as needed.    The patient was counseled the progesterone only birth control would be ideal.    Trudy Rangel MD [facility]  Department of Neurology  Pager [phone]             History of Present Illness:   CC: Patrick Do is a 28-year-old woman who comes in for a yearly follow-up.  A year ago she started having migraines with aura with numbness and tingling involving the left side of her face.  It responded quite well to Excedrin and has actually calmed down since that point in time.  During the work-up for this the patient had an MRI scan which on 1.5 Stacey imaging suggested low lying cerebellar tonsils as well as a possible right posterior cerebral artery aneurysm.  The patient was followed with a 3 Stacey MRI scan of the brain and MR angiography of the head which showed no evidence of aneurysm 6 months later.  She then had a repeat MR angiography just this past month which again demonstrates no aneurysm.  We did not recheck MRI scan of the brain to check on the cerebellar tonsils which had been stable at 6 mm with both prior imaging.    The patient reports that the  "only time she notices a headache is after vigorous exercise.  It does not happen all the time for example she went on a 9 mile run without triggering headache this weekend.  This does respond to ibuprofen or Tylenol.           Physical Exam:   /73 (BP Location: Left arm, Patient Position: Sitting)   Pulse 61   Temp 98.2  F (36.8  C) (Oral)   Resp 16   Ht 1.778 m (5' 10\")   Wt 75.9 kg (167 lb 4.8 oz)   SpO2 98%   BMI 24.01 kg/m    General appearance: The patient is well-groomed and cooperative with examination.  Neurological examination is deferred         Data:   All laboratory data reviewed  All imaging studies reviewed by me       Examination: MR angiogram of the head without intravenous contrast  dated 8/28/2019.     COMPARISON: None.     HISTORY: History of cerebral aneurysm.     TECHNIQUE: Volumetric images were obtained using time-of-flight  technique without the use of intravenous contrast and including the  head from the supraorbital region the base of skull.     FINDINGS: There is no evidence for an arterial aneurysm, previously  described P1 aneurysm is not visualized. There are no vascular  malformations. The arteries of the [place] appear normal.                                                                      IMPRESSION: Normal MR angiogram of the head with no evidence for  intracranial arterial aneurysm.     JOSE VADLES MD      DATA for DOCUMENTATION:         Past Medical History:     Patient Active Problem List   Diagnosis     Cerebral aneurysm, nonruptured     Abnormal MRI of head     Nonintractable headache, unspecified chronicity pattern, unspecified headache type     Past Medical History:   Diagnosis Date     Allergic rhinitis      Asthma      Migraine        Also see scanned health assessment forms.       Past Surgical History:     Past Surgical History:   Procedure Laterality Date     TONSILLECTOMY  1998            Social History:     Social History "     Socioeconomic History     Marital status: Single     Spouse name: Not on file     Number of children: Not on file     Years of education: Not on file     Highest education level: Not on file   Occupational History     Not on file   Social Needs     Financial resource strain: Not on file     Food insecurity:     Worry: Not on file     Inability: Not on file     Transportation needs:     Medical: Not on file     Non-medical: Not on file   Tobacco Use     Smoking status: Never Smoker     Smokeless tobacco: Never Used   Substance and Sexual Activity     Alcohol use: No     Drug use: No     Sexual activity: Never   Lifestyle     Physical activity:     Days per week: Not on file     Minutes per session: Not on file     Stress: Not on file   Relationships     Social connections:     Talks on phone: Not on file     Gets together: Not on file     Attends Mosque service: Not on file     Active member of club or organization: Not on file     Attends meetings of clubs or organizations: Not on file     Relationship status: Not on file     Intimate partner violence:     Fear of current or ex partner: Not on file     Emotionally abused: Not on file     Physically abused: Not on file     Forced sexual activity: Not on file   Other Topics Concern     Parent/sibling w/ CABG, MI or angioplasty before 65F 55M? Not Asked   Social History Narrative     Not on file              Family History:     Family History   Problem Relation Age of Onset     Breast Cancer Mother      Hypertension Mother      Hyperlipidemia Mother             Medications:     Current Outpatient Medications   Medication Sig     Cetirizine HCl (ZYRTEC ALLERGY PO) Take by mouth daily     ibuprofen (EQ IBUPROFEN) 200 MG tablet Take 3 tablets (600 mg) by mouth every 4 hours as needed for mild pain     No current facility-administered medications for this visit.               Review of Systems:   A comprehensive 10 point review of systems (constitutional, ENT,  cardiac, peripheral vascular, lymphatic, respiratory, GI, , Musculoskeletal, skin, Neurological) was performed and found to be negative except as described in this note.     See intake form completed by patient

## 2020-03-11 ENCOUNTER — HEALTH MAINTENANCE LETTER (OUTPATIENT)
Age: 30
End: 2020-03-11

## 2020-12-27 ENCOUNTER — HEALTH MAINTENANCE LETTER (OUTPATIENT)
Age: 30
End: 2020-12-27

## 2021-04-25 ENCOUNTER — HEALTH MAINTENANCE LETTER (OUTPATIENT)
Age: 31
End: 2021-04-25

## 2021-10-09 ENCOUNTER — HEALTH MAINTENANCE LETTER (OUTPATIENT)
Age: 31
End: 2021-10-09

## 2022-05-21 ENCOUNTER — HEALTH MAINTENANCE LETTER (OUTPATIENT)
Age: 32
End: 2022-05-21

## 2022-09-17 ENCOUNTER — HEALTH MAINTENANCE LETTER (OUTPATIENT)
Age: 32
End: 2022-09-17

## 2023-06-04 ENCOUNTER — HEALTH MAINTENANCE LETTER (OUTPATIENT)
Age: 33
End: 2023-06-04

## 2024-09-16 NOTE — DISCHARGE INSTRUCTIONS
Return to the emergency department with any new or worsening symptoms specifically any unilateral weakness, headache, or other neurologic changes.    Please follow-up with neurology.  Would recommend sooner than her scheduled six-month appointment.  Neurology also recommended MRI with contrast in 3 months. Please schedule this through either your PCP or neurologist.   
No

## 2025-04-28 NOTE — PROGRESS NOTES
Urgent Return visit    CC: Patient requested appointment after recent ER visit.    HPI: Ernestina is a 27-year-old right-handed woman who was initially seen on March 5, 2018.  At that time the patient had presented with new and different headaches for the span of 4 months.  Pain was occurring when she was lying on her stomach and extending her back.  It was a pressure-like sensation in the back of her head.  The workup for these new headaches included an MRI scan of the brain along with MRA angiography that revealed a small wide neck saccular aneurysm from the right posterior cerebral artery measuring 2 mm in diameter.  A solitary T2 hyperintensity that did not enhance with contrast in the right medial globus of unclear significance and borderline cerebellar tonsillar ectopia measuring 5 mm.    The patient was reassured that all of these findings while considered abnormal are not life-threatening and we had planned to follow-up in 6 months time with a repeat head image study.    The next day the patient developed some numbness in the left side of her face that spread to the right side of the face.  She describes a sensation of stiffness in her face.  Tingling then proceeded to spread to her arms and her legs and she presented to the emergency department.  She did not have a headache during this time.  She did feel as though her coordination was somewhat impaired.  In the emergency department the patient had a CT scan of the head as well as CT angiography of the head and neck with no don abnormality seen.  The patient was discharged and instructed to follow-up with me.    The patient has had complete resolution of symptoms at this time.  The numbness in the face took 4 days to resolve.    No change in patient's past medical history, social history, family history from prior visit 1 week ago.    Review of systems please see HPI all of the systems reviewed and negative    Physical examination:  /69 (BP Location:  "Right arm, Patient Position: Chair, Cuff Size: Adult Regular)  Pulse 67  Ht 1.778 m (5' 10\")  LMP 02/16/2018 (Exact Date)  Gen; awake, alert, NAD  Neuro: The patient is oriented ×3.  No aphasia or dysarthria.  Attention recall intact.  Cranial nerves II through XII intact with normal corneal reflex.  The patient has normal sensation to light touch and pinprick in all 4 extremities.  Reflexes are normal bilaterally symmetric and plantar responses are flexor.  Gait examination is stable.  Vascular examination: Temporal artery pulses are normal bilaterally symmetric.  There are no carotid bruits.  Heart is regular in rate and rhythm.    Impression/Recommendations:  The patient had an episode of facial numbness in the setting of being under a lot of stress.  Symptoms have now completely resolved and examination is normal.  At this time have to question whether this is related to her anxiety.  Alternate explanations could be some sort of migraine variant.  In the future the patient will treat as though it is migraine if the symptoms recur.  If they continue she will contact me.    Consideration for repeat MRI scan of the brain was undertaken but considering that she just recently had one in February and had CT scan images in March that were normal I do not think it is necessary at this time.  We will continue with the initial plan of the repeat MRI scan of the brain in September.    If the patient were to have further neurologic symptoms repeat MRI scan of the brain would be important to evaluate for ischemic lesions.    15 minutes with the patient over 50% counseling.    Trudy Rangel MD [facility]  Department of Neurology              " Continue Regimen: Claravis 40 mg capsule \\nQuantity: 30.0 Capsule  Days Supply: 30\\nSig: Take 1 capsule PO q day. Ipledge #[id number]. Detail Level: Zone